# Patient Record
Sex: FEMALE | Race: ASIAN | NOT HISPANIC OR LATINO | Employment: FULL TIME | URBAN - METROPOLITAN AREA
[De-identification: names, ages, dates, MRNs, and addresses within clinical notes are randomized per-mention and may not be internally consistent; named-entity substitution may affect disease eponyms.]

---

## 2017-05-26 ENCOUNTER — TELEPHONE (OUTPATIENT)
Dept: URGENT CARE | Facility: CLINIC | Age: 42
End: 2017-05-26

## 2017-05-26 ENCOUNTER — HOSPITAL ENCOUNTER (OUTPATIENT)
Dept: RADIOLOGY | Facility: MEDICAL CENTER | Age: 42
End: 2017-05-26
Attending: PHYSICIAN ASSISTANT
Payer: COMMERCIAL

## 2017-05-26 ENCOUNTER — OFFICE VISIT (OUTPATIENT)
Dept: URGENT CARE | Facility: CLINIC | Age: 42
End: 2017-05-26
Payer: COMMERCIAL

## 2017-05-26 VITALS
RESPIRATION RATE: 16 BRPM | TEMPERATURE: 98.2 F | HEIGHT: 62 IN | BODY MASS INDEX: 25.03 KG/M2 | WEIGHT: 136 LBS | OXYGEN SATURATION: 100 % | SYSTOLIC BLOOD PRESSURE: 124 MMHG | HEART RATE: 71 BPM | DIASTOLIC BLOOD PRESSURE: 82 MMHG

## 2017-05-26 DIAGNOSIS — L98.9 SKIN LESION OF FOOT: Primary | ICD-10-CM

## 2017-05-26 DIAGNOSIS — S92.514A CLOSED NONDISPLACED FRACTURE OF PROXIMAL PHALANX OF LESSER TOE OF RIGHT FOOT, INITIAL ENCOUNTER: ICD-10-CM

## 2017-05-26 DIAGNOSIS — L98.9 SKIN LESION OF FOOT: ICD-10-CM

## 2017-05-26 DIAGNOSIS — S90.121A: ICD-10-CM

## 2017-05-26 PROCEDURE — 99214 OFFICE O/P EST MOD 30 MIN: CPT | Performed by: PHYSICIAN ASSISTANT

## 2017-05-26 PROCEDURE — 73660 X-RAY EXAM OF TOE(S): CPT | Mod: RT

## 2017-05-26 ASSESSMENT — ENCOUNTER SYMPTOMS
GASTROINTESTINAL NEGATIVE: 1
FALLS: 1
RESPIRATORY NEGATIVE: 1
NEUROLOGICAL NEGATIVE: 1
PSYCHIATRIC NEGATIVE: 1
EYES NEGATIVE: 1
CARDIOVASCULAR NEGATIVE: 1
CONSTITUTIONAL NEGATIVE: 1
ROS SKIN COMMENTS: BRUISING

## 2017-05-26 NOTE — Clinical Note
May 26, 2017         Patient: Venecia Buchanan   YOB: 1975   Date of Visit: 5/26/2017           To Whom it May Concern:    Venecia Buchanan was seen in my clinic on 5/26/2017.     If you have any questions or concerns, please don't hesitate to call.        Sincerely,           Ron Tsai PA-C  Electronically Signed

## 2017-05-26 NOTE — MR AVS SNAPSHOT
"        Venecia Buchanan   2017 4:45 PM   Office Visit   MRN: [id number]    Department:  [facility]   Dept Phone:  [phone]    Description:  Female : 1975   Provider:  Ron Tsai PA-C           Reason for Visit     Foot Problem twisted right ankle into foot/ swollen and painful      Allergies as of 2017     No Known Allergies      You were diagnosed with     Skin lesion of foot   [8043933]  -  Primary     Traumatic ecchymosis of toe, right, initial encounter   [[id number]]         Vital Signs     Blood Pressure Pulse Temperature Respirations Height Weight    124/82 mmHg 71 36.8 °C (98.2 °F) 16 1.575 m (5' 2.01\") 61.689 kg (136 lb)    Body Mass Index Oxygen Saturation Last Menstrual Period Breastfeeding? Smoking Status       24.87 kg/m2 100% 2017 No Never Smoker        Basic Information     Date Of Birth Sex Race Ethnicity Preferred Language    1975 Female  Non- English      Your appointments     2017 10:30 AM   Annual Exam with Lola Alamo M.D.   [facility]    [address]  [facility] [id number] [phone]              Health Maintenance        Date Due Completion Dates    MAMMOGRAM 2016 ---    PAP SMEAR 4/15/2019 4/15/2016, 2014, 10/18/2013, 2012, 9/3/2010    IMM DTaP/Tdap/Td Vaccine (2 - Td) 3/24/2021 3/24/2011            Current Immunizations     Tdap Vaccine 3/24/2011  8:00 PM      Below and/or attached are the medications your provider expects you to take. Review all of your home medications and newly ordered medications with your provider and/or pharmacist. Follow medication instructions as directed by your provider and/or pharmacist. Please keep your medication list with you and share with your provider. Update the information when medications are discontinued, doses are changed, or new medications (including over-the-counter products) are added; and " carry medication information at all times in the event of emergency situations     Allergies:  No Known Allergies          Medications  Valid as of: May 26, 2017 -  5:37 PM    Generic Name Brand Name Tablet Size Instructions for use    Clindamycin HCl (Cap) CLEOCIN 300 MG Take 1 Cap by mouth every 6 hours.        Dicloxacillin Sodium (Cap) DYNAPEN 500 MG Take 1 Cap by mouth 4 times a day. Take 500 mg PO every 6 hours x 7 days        Dicloxacillin Sodium (Cap) DYNAPEN 500 MG Take 1 Cap by mouth 2 times a day. Take 1 PO q 6 hours x 7 days        Docusate Sodium (Cap) COLACE 100 MG Take 100 mg by mouth 2 times a day as needed.        Ibuprofen (Tab) MOTRIN 800 MG Take 800 mg by mouth every 8 hours as needed. For mild pain or discomfort.         Mupirocin Calcium (Ointment) BACTROBAN 2 % Spray  in nose every 12 hours. X 5 days        Oxycodone-Acetaminophen (Tab) PERCOCET 5-325 MG Take 1-2 Tabs by mouth every 6 hours as needed. Indications: Moderate to Moderately Severe Pain        Oxycodone-Acetaminophen (Tab) PERCOCET 5-325 MG Take 1-2 Tabs by mouth every four hours as needed for Mild Pain.        Oxycodone-Acetaminophen (Tab) PERCOCET 5-325 MG Take 1-2 Tabs by mouth every four hours as needed for Mild Pain.        Oxycodone-Acetaminophen (Tab) PERCOCET 5-325 MG Take 1-2 Tabs by mouth every 6 hours as needed.        Prenatal Multivit-Min-Fe-FA   Take  by mouth.        Simethicone (Chew Tab) MYLICON 80 MG Take 80 mg by mouth every 6 hours as needed. Indications: Gas        .                 Medicines prescribed today were sent to:     [facility] - [place], [place] - [address] AT [facility] [address] [facility]    [address] [address]    Phone: [phone] Fax: [phone]    Open 24 Hours?: No      Medication refill instructions:       If your prescription bottle indicates you have medication refills left, it is not necessary to call your provider’s office. Please contact your pharmacy and  they will refill your medication.    If your prescription bottle indicates you do not have any refills left, you may request refills at any time through one of the following ways: The online [employer] system (except Urgent Care), by calling your provider’s office, or by asking your pharmacy to contact your provider’s office with a refill request. Medication refills are processed only during regular business hours and may not be available until the next business day. Your provider may request additional information or to have a follow-up visit with you prior to refilling your medication.   *Please Note: Medication refills are assigned a new Rx number when refilled electronically. Your pharmacy may indicate that no refills were authorized even though a new prescription for the same medication is available at the pharmacy. Please request the medicine by name with the pharmacy before contacting your provider for a refill.        Your To Do List     Future Labs/Procedures Complete By Expires    DX-TOE(S) 2+ RIGHT  As directed 5/26/2018      Referral     A referral request has been sent to our patient care coordination department. Please allow 3-5 business days for us to process this request and contact you either by phone or mail. If you do not hear from us by the 5th business day, please call us at [phone].        Instructions    Toe Fracture  A toe fracture is a break in one of the toe bones (phalanges).  CAUSES  This condition may be caused by:  · Dropping a heavy object on your toe.  · Stubbing your toe.  · Overusing your toe or doing repetitive exercise.  · Twisting or stretching your toe out of place.  RISK FACTORS  This condition is more likely to develop in people who:  · Play contact sports.  · Have a bone disease.  · Have a low calcium level.  SYMPTOMS  The main symptoms of this condition are swelling and pain in the toe. The pain may get worse with standing or walking. Other symptoms  include:  · Bruising.  · Stiffness.  · Numbness.  · A change in the way the toe looks.  · Broken bones that poke through the skin.  · Blood beneath the toenail.  DIAGNOSIS  This condition is diagnosed with a physical exam. You may also have X-rays.  TREATMENT   Treatment for this condition depends on the type of fracture and its severity. Treatment may involve:  · Taping the broken toe to a toe that is next to it (bala taping). This is the most common treatment for fractures in which the bone has not moved out of place (nondisplaced fracture).  · Wearing a shoe that has a wide, rigid sole to protect the toe and to limit its movement.  · Wearing a walking cast.  · Having a procedure to move the toe back into place.  · Surgery. This may be needed:  ¨ If there are many pieces of broken bone that are out of place (displaced).  ¨ If the toe joint breaks.  ¨ If the bone breaks through the skin.  · Physical therapy. This is done to help regain movement and strength in the toe.  You may need follow-up X-rays to make sure that the bone is healing well and staying in position.  HOME CARE INSTRUCTIONS  · If your toe was treated with bala taping, follow instructions from your health care provider about changing the gauze and tape.  If You Have a Cast:  · Do not stick anything inside the cast to scratch your skin. Doing that increases your risk of infection.  · Check the skin around the cast every day. Report any concerns to your health care provider. You may put lotion on dry skin around the edges of the cast. Do not apply lotion to the skin underneath the cast.  · Do not put pressure on any part of the cast until it is fully hardened. This may take several hours.  · Keep the cast clean and dry.  Bathing  · Do not take baths, swim, or use a hot tub until your health care provider approves. Ask your health care provider if you can take showers. You may only be allowed to take sponge baths for bathing.  · If your health care  provider approves bathing and showering, cover the cast or bandage (dressing) with a watertight plastic bag to protect it from water. Do not let the cast or dressing get wet.  Managing Pain, Stiffness, and Swelling  · If you do not have a cast, apply ice to the injured area, if directed.  ¨ Put ice in a plastic bag.  ¨ Place a towel between your skin and the bag.  ¨ Leave the ice on for 20 minutes, 2-3 times per day.  · Move your toes often to avoid stiffness and to lessen swelling.  · Raise (elevate) the injured area above the level of your heart while you are sitting or lying down.  Driving  · Do not drive or operate heavy machinery while taking pain medicine.  · Do not drive while wearing a cast on a foot that you use for driving.  Activity  · Return to your normal activities as directed by your health care provider. Ask your health care provider what activities are safe for you.  · Perform exercises daily as directed by your health care provider or physical therapist.  Safety  · Do not use the injured limb to support your body weight until your health care provider says that you can. Use crutches or other assistive devices as directed by your health care provider.  General Instructions  · If your toe was treated with bala taping, follow your health care provider's instructions for changing the gauze and tape. Change it more often:  ¨ The gauze and tape get wet. If this happens, dry the space between the toes.  ¨ The gauze and tape are too tight and cause your toe to become pale or numb.  · Wear a protective shoe as directed by your health care provider. If you were not given a protective shoe, wear sturdy, supportive shoes. Your shoes should not pinch your toes and should not fit tightly against your toes.  · Do not use any tobacco products, including cigarettes, chewing tobacco, or e-cigarettes. Tobacco can delay bone healing. If you need help quitting, ask your health care provider.  · Take medicines only as  directed by your health care provider.  · Keep all follow-up visits as directed by your health care provider. This is important.  SEEK MEDICAL CARE IF:  · You have a fever.  · Your pain medicine is not helping.  · Your toe is cold.  · Your toe is numb.  · You still have pain after one week of rest and treatment.  · You still have pain after your health care provider has said that you can start walking again.  · You have pain, tingling, or numbness in your foot that is not going away.  SEEK IMMEDIATE MEDICAL CARE IF:  · You have severe pain.  · You have redness or inflammation in your toe that is getting worse.  · You have pain or numbness in your toe that is getting worse.  · Your toe turns blue.     This information is not intended to replace advice given to you by your health care provider. Make sure you discuss any questions you have with your health care provider.     Document Released: 12/15/2001 Document Revised: 05/03/2016 Document Reviewed: 10/14/2015  [employer] Interactive Patient Education ©2016 [employer]            [employer] Access Code: Activation code not generated  Current MedAware Status: Active

## 2017-05-26 NOTE — Clinical Note
May 26, 2017         Patient: Venecia Buchanan   YOB: 1975   Date of Visit: 5/26/2017           To Whom it May Concern:    Venecia Buchanan was seen in my clinic on 5/26/2017. Please excuse her for the next week 5/30-6/2.    If you have any questions or concerns, please don't hesitate to call.        Sincerely,           Ron Tsai PA-C  Electronically Signed

## 2017-05-27 NOTE — TELEPHONE ENCOUNTER
Called patient on cell and informed her of x-ray results.  All questions encouraged and answered. Patient to f/u with pcp.  Recommended RICE.  Return if pain is worsening or having numbness.

## 2017-05-27 NOTE — PROGRESS NOTES
Subjective:      Venecia Buchanan is a 41 y.o. female who presents with Foot Problem            Foot Problem      Chief Complaint   Patient presents with   • Foot Problem     twisted right ankle into foot/ swollen and painful       HPI:  Venecia Buchanan is a 41 y.o. female who presents with right  toe pain after having a ground level mechanical fall this am.  No numbness or tingling.  She states she was getting up from bed and rolled her right ankle.  Has pain now over the little toe with bruising.  Patient denies HA, SOB, chest pain, palpitations, fever, chills, or n/v/d.      No past medical history on file.    Past Surgical History   Procedure Laterality Date   • Primary c section  3/24/2011     Performed by CASSANDRA TAMAYO at [facility]       Family History   Problem Relation Age of Onset   • Diabetes Mother    • Cancer Maternal Grandfather      COLON   • Cancer Paternal Grandmother      BREAST       Social History     Social History   • Marital Status:      Spouse Name: N/A   • Number of Children: N/A   • Years of Education: N/A     Occupational History   • Not on file.     Social History Main Topics   • Smoking status: Never Smoker    • Smokeless tobacco: Not on file   • Alcohol Use: No   • Drug Use: Not on file   • Sexual Activity: No     Other Topics Concern   • Not on file     Social History Narrative         Current outpatient prescriptions:   •  oxycodone-acetaminophen, 1-2 Tab, Oral, Q6HRS PRN, not taking  •  mupirocin, Spray  in nose every 12 hours. X 5 days, Not Taking  •  clindamycin, 300 mg, Oral, Q6HRS, Not Taking  •  oxycodone-acetaminophen, 1-2 Tab, Oral, Q4HRS PRN, Not Taking  •  dicloxacillin, 500 mg, Oral, BID, Not Taking  •  dicloxacillin, 500 mg, Oral, 4X/DAY, Not Taking  •  oxycodone-acetaminophen, 1-2 Tab, Oral, Q4HRS PRN, Not Taking  •  oxycodone-acetaminophen, 1-2 Tab, Oral, Q6HRS PRN, Not Taking  •  docusate sodium, 100 mg, Oral, BID PRN, Not Taking  •  ibuprofen, 800 mg,  "Oral, Q8HRS PRN, Not Taking  •  simethicone, 80 mg, Oral, Q6HRS PRN, Not Taking  •  PRENATAL VITAMINS PO, Take  by mouth., Not Taking at Unknown    No Known Allergies         Review of Systems   Constitutional: Negative.    HENT: Negative.    Eyes: Negative.    Respiratory: Negative.    Cardiovascular: Negative.    Gastrointestinal: Negative.    Genitourinary: Negative.    Musculoskeletal: Positive for joint pain and falls.   Skin:        bruising   Neurological: Negative.    Endo/Heme/Allergies: Negative.    Psychiatric/Behavioral: Negative.           Objective:     /82 mmHg  Pulse 71  Temp(Src) 36.8 °C (98.2 °F)  Resp 16  Ht 1.575 m (5' 2.01\")  Wt 61.689 kg (136 lb)  BMI 24.87 kg/m2  SpO2 100%  LMP 05/20/2017  Breastfeeding? No     Physical Exam       Nursing note and vitals reviewed.    Constitutional:  Appropriately groomed, pleasant affect, well nourished, and in no acute distress.    HEENT:  Head: Atraumatic, normocephalic.    Ears:  Hearing grossly intact to voice.    Eyes:  Conjunctivae clear, sclera white, and medial canthus without exudate bilaterally.    Lungs:  Lungs with normal respiratory excursion and effort.      Right Foot:  No edema, erythema.  Ecchymosis present at the base of the small toe and in the web space between 4th and 5th toe.  No ttp along the 5th metatarsal.  No ttp over the over ATFL, deltoid ligaments, med/lat malleoulus, achilles tendon, or metatarsals.      Diminished ROM for little toe ext/flex.    Sensation equal bilaterally to light touch for L4, L5, and S1.      DTR 2+ for Achilles and patella bilaterally.  NVS intact, DP 2+ bilaterally.  Gait and station wnl, non antalgic.    Derm:  Suspicious 8mm -10mm irregular darkly pigmented macule plantar surface of right foot.  Macule with varying levels of pigmentation.      No other rashes or lesions with good turgor pressure.     Psychiatric:  Normal judgement, mood and affect.       5/26/2017 6:33 PM    HISTORY/REASON " FOR EXAM:  Pain/Deformity Following Trauma.  Right fifth toe pain and swelling following injury    TECHNIQUE/EXAM DESCRIPTION AND NUMBER OF VIEWS:  3 views of the RIGHT toes.    COMPARISON: None    FINDINGS:  There is a fracture in the proximal metaphysis of the fifth proximal phalanx with overlying soft tissue edema. No significant displacement is appreciated.         Impression        Nondisplaced fracture in the proximal metaphysis of the fifth proximal phalanx.         Reading Provider Reading Date     Agueda Almonte M.D. May 26, 2017          Assessment/Plan:     1. Traumatic ecchymosis of toe, right, initial encounter    2. Skin lesion of foot  Patient with highly suspicious lesion to bottom of right foot present for several months.  Has enlarged.  Concern for melanoma and referred to derm and gen surg whomever can biopsy and dx sooner.  Did advise pt f/u with pcp for biopsy if not contacted in the next week.    - REFERRAL TO DERMATOLOGY  - DX-TOE(S) 2+ RIGHT; Future  - REFERRAL TO GENERAL SURGERY    3. Closed nondisplaced fracture of proximal phalanx of lesser toe of right foot, initial encounter  Patient presents with toe injury and ecchymosis after a ground level mechanical fall this am.  On exam patient has reduced flex/ext.  X-ray done at [facility] reviewed by me and with patient demonstrates a proximal phalanx small toe non-displaced fxr.  Did recommend f/u with pcp in 1-2 weeks for repeat x-ray to demonstrate fxr is still non-displaced and developing a callus.  Placed patient in a walking shoe and recommended elevation, icing, and NSAIDs.  Did provide note.  Advised f/u with pcp for FMLA paperwork if requiring more time off work.    Patient was in agreement with this treatment plan and seemed to understand without barriers. All questions were encouraged and answered.  Reviewed signs and symptoms of when to seek emergency medical care.     Please note that this dictation was created using voice  recognition software.  I have made every reasonable attempt to correct obvious errors, but I expect there are errors of katalina and possibly content that I did not discover before finalizing the note.

## 2017-05-27 NOTE — PATIENT INSTRUCTIONS
Toe Fracture  A toe fracture is a break in one of the toe bones (phalanges).  CAUSES  This condition may be caused by:  · Dropping a heavy object on your toe.  · Stubbing your toe.  · Overusing your toe or doing repetitive exercise.  · Twisting or stretching your toe out of place.  RISK FACTORS  This condition is more likely to develop in people who:  · Play contact sports.  · Have a bone disease.  · Have a low calcium level.  SYMPTOMS  The main symptoms of this condition are swelling and pain in the toe. The pain may get worse with standing or walking. Other symptoms include:  · Bruising.  · Stiffness.  · Numbness.  · A change in the way the toe looks.  · Broken bones that poke through the skin.  · Blood beneath the toenail.  DIAGNOSIS  This condition is diagnosed with a physical exam. You may also have X-rays.  TREATMENT   Treatment for this condition depends on the type of fracture and its severity. Treatment may involve:  · Taping the broken toe to a toe that is next to it (bala taping). This is the most common treatment for fractures in which the bone has not moved out of place (nondisplaced fracture).  · Wearing a shoe that has a wide, rigid sole to protect the toe and to limit its movement.  · Wearing a walking cast.  · Having a procedure to move the toe back into place.  · Surgery. This may be needed:  ¨ If there are many pieces of broken bone that are out of place (displaced).  ¨ If the toe joint breaks.  ¨ If the bone breaks through the skin.  · Physical therapy. This is done to help regain movement and strength in the toe.  You may need follow-up X-rays to make sure that the bone is healing well and staying in position.  HOME CARE INSTRUCTIONS  · If your toe was treated with bala taping, follow instructions from your health care provider about changing the gauze and tape.  If You Have a Cast:  · Do not stick anything inside the cast to scratch your skin. Doing that increases your risk of  infection.  · Check the skin around the cast every day. Report any concerns to your health care provider. You may put lotion on dry skin around the edges of the cast. Do not apply lotion to the skin underneath the cast.  · Do not put pressure on any part of the cast until it is fully hardened. This may take several hours.  · Keep the cast clean and dry.  Bathing  · Do not take baths, swim, or use a hot tub until your health care provider approves. Ask your health care provider if you can take showers. You may only be allowed to take sponge baths for bathing.  · If your health care provider approves bathing and showering, cover the cast or bandage (dressing) with a watertight plastic bag to protect it from water. Do not let the cast or dressing get wet.  Managing Pain, Stiffness, and Swelling  · If you do not have a cast, apply ice to the injured area, if directed.  ¨ Put ice in a plastic bag.  ¨ Place a towel between your skin and the bag.  ¨ Leave the ice on for 20 minutes, 2-3 times per day.  · Move your toes often to avoid stiffness and to lessen swelling.  · Raise (elevate) the injured area above the level of your heart while you are sitting or lying down.  Driving  · Do not drive or operate heavy machinery while taking pain medicine.  · Do not drive while wearing a cast on a foot that you use for driving.  Activity  · Return to your normal activities as directed by your health care provider. Ask your health care provider what activities are safe for you.  · Perform exercises daily as directed by your health care provider or physical therapist.  Safety  · Do not use the injured limb to support your body weight until your health care provider says that you can. Use crutches or other assistive devices as directed by your health care provider.  General Instructions  · If your toe was treated with bala taping, follow your health care provider's instructions for changing the gauze and tape. Change it more  often:  ¨ The gauze and tape get wet. If this happens, dry the space between the toes.  ¨ The gauze and tape are too tight and cause your toe to become pale or numb.  · Wear a protective shoe as directed by your health care provider. If you were not given a protective shoe, wear sturdy, supportive shoes. Your shoes should not pinch your toes and should not fit tightly against your toes.  · Do not use any tobacco products, including cigarettes, chewing tobacco, or e-cigarettes. Tobacco can delay bone healing. If you need help quitting, ask your health care provider.  · Take medicines only as directed by your health care provider.  · Keep all follow-up visits as directed by your health care provider. This is important.  SEEK MEDICAL CARE IF:  · You have a fever.  · Your pain medicine is not helping.  · Your toe is cold.  · Your toe is numb.  · You still have pain after one week of rest and treatment.  · You still have pain after your health care provider has said that you can start walking again.  · You have pain, tingling, or numbness in your foot that is not going away.  SEEK IMMEDIATE MEDICAL CARE IF:  · You have severe pain.  · You have redness or inflammation in your toe that is getting worse.  · You have pain or numbness in your toe that is getting worse.  · Your toe turns blue.     This information is not intended to replace advice given to you by your health care provider. Make sure you discuss any questions you have with your health care provider.     Document Released: 12/15/2001 Document Revised: 05/03/2016 Document Reviewed: 10/14/2015  [employer] Interactive Patient Education ©2016 [facility] [employer]

## 2017-07-05 ENCOUNTER — HOSPITAL ENCOUNTER (OUTPATIENT)
Facility: MEDICAL CENTER | Age: 42
End: 2017-07-05
Attending: OBSTETRICS & GYNECOLOGY
Payer: COMMERCIAL

## 2017-07-05 ENCOUNTER — GYNECOLOGY VISIT (OUTPATIENT)
Dept: OBGYN | Facility: MEDICAL CENTER | Age: 42
End: 2017-07-05
Payer: COMMERCIAL

## 2017-07-05 VITALS
WEIGHT: 126.4 LBS | HEIGHT: 62 IN | DIASTOLIC BLOOD PRESSURE: 72 MMHG | BODY MASS INDEX: 23.26 KG/M2 | SYSTOLIC BLOOD PRESSURE: 120 MMHG

## 2017-07-05 DIAGNOSIS — Z12.4 SCREENING FOR MALIGNANT NEOPLASM OF CERVIX: ICD-10-CM

## 2017-07-05 DIAGNOSIS — Z11.51 SCREENING FOR HUMAN PAPILLOMAVIRUS: ICD-10-CM

## 2017-07-05 DIAGNOSIS — Z01.419 WELL FEMALE EXAM WITH ROUTINE GYNECOLOGICAL EXAM: ICD-10-CM

## 2017-07-05 DIAGNOSIS — D48.5 NEOPLASM OF UNCERTAIN BEHAVIOR OF SKIN: ICD-10-CM

## 2017-07-05 PROCEDURE — 99396 PREV VISIT EST AGE 40-64: CPT | Performed by: OBSTETRICS & GYNECOLOGY

## 2017-07-05 PROCEDURE — 87624 HPV HI-RISK TYP POOLED RSLT: CPT

## 2017-07-05 PROCEDURE — 88175 CYTOPATH C/V AUTO FLUID REDO: CPT

## 2017-07-05 NOTE — PROGRESS NOTES
ANNUAL Gynecologic Exam     HPI Comments:   41 year old  presents for well woman exam.   Patient's last menstrual period was 2017.  Regular periods, no aberration  No pelvic pains  No method of BC at this time  Never smoker  Denies family history of breast/ovarian cancer    Review of Systems   Pertinent positives documented in HPI and all other systems reviewed & are negative    All PMH, PSH, allergies, social history and FH reviewed and updated today:  History reviewed. No pertinent past medical history.  Past Surgical History   Procedure Laterality Date   • Primary c section  3/24/2011     Performed by CASSANDRA TAMAYO at [facility]     Review of patient's allergies indicates no known allergies.  Social History     Social History   • Marital Status:      Spouse Name: N/A   • Number of Children: N/A   • Years of Education: N/A     Social History Main Topics   • Smoking status: Never Smoker    • Smokeless tobacco: None   • Alcohol Use: No   • Drug Use: None   • Sexual Activity: No     Other Topics Concern   • None     Social History Narrative     Family History   Problem Relation Age of Onset   • Diabetes Mother    • Cancer Maternal Grandfather      COLON   • Cancer Paternal Grandmother      BREAST     Medications:   Current Outpatient Prescriptions Ordered in [facility]   Medication Sig Dispense Refill   • oxycodone-acetaminophen (PERCOCET) 5-325 MG TABS Take 1-2 Tabs by mouth every 6 hours as needed. 10 Tab 0   • mupirocin calcium (BACTROBAN NASAL) 2 % nasal ointment Spray  in nose every 12 hours. X 5 days 1 Tube 0   • clindamycin (CLEOCIN) 300 MG CAPS Take 1 Cap by mouth every 6 hours. 28 Cap 0   • oxycodone-acetaminophen (PERCOCET) 5-325 MG TABS Take 1-2 Tabs by mouth every four hours as needed for Mild Pain. 30 Each 0   • dicloxacillin (DYNAPEN) 500 MG CAPS Take 1 Cap by mouth 2 times a day. Take 1 PO q 6 hours x 7 days 28 Cap 0   • dicloxacillin (DYNAPEN) 500 MG CAPS Take 1 Cap by mouth 4 times  "a day. Take 500 mg PO every 6 hours x 7 days 28 Cap 0   • oxycodone-acetaminophen (PERCOCET) 5-325 MG TABS Take 1-2 Tabs by mouth every four hours as needed for Mild Pain. 30 Each 0   • oxycodone-acetaminophen (PERCOCET) 5-325 MG TABS Take 1-2 Tabs by mouth every 6 hours as needed. Indications: Moderate to Moderately Severe Pain     • docusate sodium (COLACE) 100 MG CAPS Take 100 mg by mouth 2 times a day as needed.     • ibuprofen (MOTRIN) 800 MG TABS Take 800 mg by mouth every 8 hours as needed. For mild pain or discomfort.      • simethicone (MYLICON) 80 MG CHEW Take 80 mg by mouth every 6 hours as needed. Indications: Gas     • PRENATAL VITAMINS PO Take  by mouth.       No current Epic-ordered facility-administered medications on file.      Objective:   Vital measurements:  Blood pressure 120/72, height 1.575 m (5' 2\"), weight 57.335 kg (126 lb 6.4 oz), last menstrual period 06/16/2017, not currently breastfeeding.  Body mass index is 23.11 kg/(m^2). (Goal BM I>18 <25)    Physical Exam   Nursing note and vitals reviewed.  Constitutional: She is oriented to person, place, and time. She appears well-developed and well-nourished. No distress.     HEENT:   Head: Normocephalic and atraumatic.   Right Ear: External ear normal.   Left Ear: External ear normal.   Nose: Nose normal.   Eyes: Conjunctivae and EOM are normal. Pupils are equal, round, and reactive to light. No scleral icterus.     Neck: Normal range of motion. Neck supple. No tracheal deviation present. No thyromegaly present.     Pulmonary/Chest: Effort normal and breath sounds normal. No respiratory distress. She has no wheezes. She has no rales. She exhibits no tenderness.     Cardiovascular: Regular, rate and rhythm. No JVD.    Abdominal: Soft. Bowel sounds are normal. She exhibits no distension and no mass. No tenderness. She has no rebound and no guarding.     Left axilla - 1-2 mm movable nodule, seems subcutaneous, no " tenderness    Breast:  Symmetrical, normal consistency without masses., No dimpling or skin changes, Normal nipples without discharge, negative    Genitourinary:  Pelvic exam was performed with patient supine.  External genitalia with no abnormal pigmentation, labial fusion,rash, tenderness, lesion or injury to the labia bilaterally.  Vagina is moist with no lesions, foul discharge, erythema, tenderness or bleeding. No foreign body around the vagina or signs of injury.   Cervix exhibits no motion tenderness, no discharge and no friability.   Uterus is  not deviated, not enlarged, not fixed and not tender.  Right adnexum displays no mass, no tenderness and no fullness. Left adnexum displays no mass, no tenderness and no fullness.     Musculoskeletal: Normal range of motion. She exhibits no edema and no tenderness.     Lymphadenopathy: She has no cervical adenopathy.     Neurological: She is alert and oriented to person, place, and time. She exhibits normal muscle tone.     Skin: Skin is warm and dry. No rash noted. She is not diaphoretic. No erythema. No pallor. right heel  - (+) hyperpigmented lesion 5 mm - as per pt, is increasing in size      Psychiatric: She has a normal mood and affect. Her behavior is normal. Judgment and thought content normal  Assessment:     1. Well female exam with routine gynecological exam  THINPREP PAP WITH HPV   2. Screening for malignant neoplasm of cervix  THINPREP PAP WITH HPV   3. Screening for human papillomavirus  THINPREP PAP WITH HPV   4. Neoplasm of uncertain behavior of skin  REFERRAL TO DERMATOLOGY     Plan:   Pap and physical exam performed. Recommendation for pap discussed with her q 3 years, pt prefers yearly pap  Monthly SBE.  Counseling: breast self exam, mammography screening, STD prevention, HIV risk factors and prevention and family planning choices  Encourage exercise and proper diet.  Mammograms annually. Obtain most recent report from C  See medications and  orders placed in encounter report.

## 2017-07-05 NOTE — MR AVS SNAPSHOT
"Venecia Buchanan   2017 10:30 AM   Gynecology Visit   MRN: [id number]    Department:  [facility]   Dept Phone:  [phone]    Description:  Female : 1975   Provider:  Lola Alamo M.D.           Allergies as of 2017     No Known Allergies      You were diagnosed with     Well female exam with routine gynecological exam   [181865]       Screening for malignant neoplasm of cervix   [245434]       Screening for human papillomavirus   [845268]       Neoplasm of uncertain behavior of skin   [238.2.ICD-9-CM]         Vital Signs     Blood Pressure Height Weight Body Mass Index Last Menstrual Period Breastfeeding?    120/72 mmHg 1.575 m (5' 2\") 57.335 kg (126 lb 6.4 oz) 23.11 kg/m2 2017 No    Smoking Status                   Never Smoker            Basic Information     Date Of Birth Sex Race Ethnicity Preferred Language    1975 Female  Non- English      Health Maintenance        Date Due Completion Dates    MAMMOGRAM 2016 ---    IMM INFLUENZA (1) 2017 ---    PAP SMEAR 4/15/2019 4/15/2016, 2014, 10/18/2013, 2012, 9/3/2010    IMM DTaP/Tdap/Td Vaccine (2 - Td) 3/24/2021 3/24/2011            Current Immunizations     Tdap Vaccine 3/24/2011  8:00 PM      Below and/or attached are the medications your provider expects you to take. Review all of your home medications and newly ordered medications with your provider and/or pharmacist. Follow medication instructions as directed by your provider and/or pharmacist. Please keep your medication list with you and share with your provider. Update the information when medications are discontinued, doses are changed, or new medications (including over-the-counter products) are added; and carry medication information at all times in the event of emergency situations     Allergies:  No Known Allergies          Medications  Valid as of: 2017 - 11:09 AM    Generic Name Brand Name Tablet Size " Instructions for use    Clindamycin HCl (Cap) CLEOCIN 300 MG Take 1 Cap by mouth every 6 hours.        Dicloxacillin Sodium (Cap) DYNAPEN 500 MG Take 1 Cap by mouth 4 times a day. Take 500 mg PO every 6 hours x 7 days        Dicloxacillin Sodium (Cap) DYNAPEN 500 MG Take 1 Cap by mouth 2 times a day. Take 1 PO q 6 hours x 7 days        Docusate Sodium (Cap) COLACE 100 MG Take 100 mg by mouth 2 times a day as needed.        Ibuprofen (Tab) MOTRIN 800 MG Take 800 mg by mouth every 8 hours as needed. For mild pain or discomfort.         Mupirocin Calcium (Ointment) BACTROBAN 2 % Spray  in nose every 12 hours. X 5 days        Oxycodone-Acetaminophen (Tab) PERCOCET 5-325 MG Take 1-2 Tabs by mouth every 6 hours as needed. Indications: Moderate to Moderately Severe Pain        Oxycodone-Acetaminophen (Tab) PERCOCET 5-325 MG Take 1-2 Tabs by mouth every four hours as needed for Mild Pain.        Oxycodone-Acetaminophen (Tab) PERCOCET 5-325 MG Take 1-2 Tabs by mouth every four hours as needed for Mild Pain.        Oxycodone-Acetaminophen (Tab) PERCOCET 5-325 MG Take 1-2 Tabs by mouth every 6 hours as needed.        Prenatal Multivit-Min-Fe-FA   Take  by mouth.        Simethicone (Chew Tab) MYLICON 80 MG Take 80 mg by mouth every 6 hours as needed. Indications: Gas        .                 Medicines prescribed today were sent to:     [facility] [address] [place] [address] & [address] [address]    Phone: [phone] Fax: [phone]    Open 24 Hours?: No      Medication refill instructions:       If your prescription bottle indicates you have medication refills left, it is not necessary to call your provider’s office. Please contact your pharmacy and they will refill your medication.    If your prescription bottle indicates you do not have any refills left, you may request refills at any time through one of the following ways: The online imagine system (except  Urgent Care), by calling your provider’s office, or by asking your pharmacy to contact your provider’s office with a refill request. Medication refills are processed only during regular business hours and may not be available until the next business day. Your provider may request additional information or to have a follow-up visit with you prior to refilling your medication.   *Please Note: Medication refills are assigned a new Rx number when refilled electronically. Your pharmacy may indicate that no refills were authorized even though a new prescription for the same medication is available at the pharmacy. Please request the medicine by name with the pharmacy before contacting your provider for a refill.        Your To Do List     Future Labs/Procedures Complete By Expires    THINPREP PAP WITH HPV  As directed 7/5/2018      Referral     A referral request has been sent to our patient care coordination department. Please allow 3-5 business days for us to process this request and contact you either by phone or mail. If you do not hear from us by the 5th business day, please call us at [phone].           [employer] Access Code: Activation code not generated  Current [employer] Status: Active

## 2017-07-06 LAB
CYTOLOGY REG CYTOL: NORMAL
HPV HR 12 DNA CVX QL NAA+PROBE: NEGATIVE
HPV16 DNA SPEC QL NAA+PROBE: NEGATIVE
HPV18 DNA SPEC QL NAA+PROBE: NEGATIVE
SPECIMEN SOURCE: NORMAL

## 2017-09-18 PROBLEM — D22.71 MELANOCYTIC NEVI OF RIGHT LOWER LIMB, INCLUDING HIP: Status: ACTIVE | Noted: 2017-09-18

## 2017-10-02 PROBLEM — D49.2 NEOPLASM OF UNSPECIFIED BEHAVIOR OF BONE, SOFT TISSUE, AND SKIN: Status: RESOLVED | Noted: 2017-09-18 | Resolved: 2017-10-02

## 2017-12-18 ENCOUNTER — APPOINTMENT (RX ONLY)
Dept: URBAN - METROPOLITAN AREA CLINIC 4 | Facility: CLINIC | Age: 42
Setting detail: DERMATOLOGY
End: 2017-12-18

## 2017-12-18 DIAGNOSIS — L81.4 OTHER MELANIN HYPERPIGMENTATION: ICD-10-CM

## 2017-12-18 DIAGNOSIS — L72.0 EPIDERMAL CYST: ICD-10-CM

## 2017-12-18 DIAGNOSIS — I78.8 OTHER DISEASES OF CAPILLARIES: ICD-10-CM

## 2017-12-18 DIAGNOSIS — D22 MELANOCYTIC NEVI: ICD-10-CM

## 2017-12-18 PROBLEM — D22.71 MELANOCYTIC NEVI OF RIGHT LOWER LIMB, INCLUDING HIP: Status: ACTIVE | Noted: 2017-12-18

## 2017-12-18 PROCEDURE — ? COUNSELING

## 2017-12-18 PROCEDURE — 99213 OFFICE O/P EST LOW 20 MIN: CPT

## 2017-12-18 ASSESSMENT — LOCATION SIMPLE DESCRIPTION DERM
LOCATION SIMPLE: LEFT AXILLARY VAULT
LOCATION SIMPLE: RIGHT CHEEK
LOCATION SIMPLE: LEFT CHEEK
LOCATION SIMPLE: INFERIOR FOREHEAD
LOCATION SIMPLE: RIGHT PLANTAR SURFACE
LOCATION SIMPLE: CHIN

## 2017-12-18 ASSESSMENT — LOCATION DETAILED DESCRIPTION DERM
LOCATION DETAILED: LEFT INFERIOR CENTRAL MALAR CHEEK
LOCATION DETAILED: LEFT AXILLARY VAULT
LOCATION DETAILED: RIGHT CHIN
LOCATION DETAILED: RIGHT INFERIOR CENTRAL MALAR CHEEK
LOCATION DETAILED: INFERIOR MID FOREHEAD
LOCATION DETAILED: LEFT INFERIOR LATERAL MALAR CHEEK
LOCATION DETAILED: RIGHT INSTEP

## 2017-12-18 ASSESSMENT — LOCATION ZONE DERM
LOCATION ZONE: FACE
LOCATION ZONE: AXILLAE
LOCATION ZONE: FEET

## 2017-12-18 NOTE — PROCEDURE: COUNSELING
Detail Level: Zone
Detail Level: Detailed
Patient Specific Counseling (Will Not Stick From Patient To Patient): Patient will call if cyst becomes bothersome for removal (excision).

## 2017-12-18 NOTE — HPI: FOLLOW UP OTHER
What Is Your Reason For Requesting A Follow-Up Appointment?: Patient seen last visit had biopsy done on right plantar which was sent to [facility] for evaluation, diagnosis revealed a Junctional Melanocytic Nevus without any worrisome features or anything that compels further treatment. \\nPatient has no other complaints or concerns

## 2017-12-18 NOTE — HPI: SECONDARY COMPLAINT
How Severe Is This Condition?: mild
Additional History: Patient interested in cosmetic procedures for aging spots/texture

## 2018-01-03 ENCOUNTER — APPOINTMENT (OUTPATIENT)
Dept: RADIOLOGY | Facility: IMAGING CENTER | Age: 43
End: 2018-01-03
Attending: EMERGENCY MEDICINE
Payer: COMMERCIAL

## 2018-01-03 ENCOUNTER — HOSPITAL ENCOUNTER (OUTPATIENT)
Dept: LAB | Facility: MEDICAL CENTER | Age: 43
End: 2018-01-03
Attending: EMERGENCY MEDICINE
Payer: COMMERCIAL

## 2018-01-03 ENCOUNTER — OFFICE VISIT (OUTPATIENT)
Dept: URGENT CARE | Facility: CLINIC | Age: 43
End: 2018-01-03
Payer: COMMERCIAL

## 2018-01-03 VITALS
SYSTOLIC BLOOD PRESSURE: 110 MMHG | TEMPERATURE: 97.6 F | HEIGHT: 62 IN | BODY MASS INDEX: 23.19 KG/M2 | HEART RATE: 76 BPM | WEIGHT: 126 LBS | DIASTOLIC BLOOD PRESSURE: 68 MMHG | RESPIRATION RATE: 14 BRPM | OXYGEN SATURATION: 98 %

## 2018-01-03 DIAGNOSIS — R04.2 COUGH WITH HEMOPTYSIS: ICD-10-CM

## 2018-01-03 DIAGNOSIS — Z32.02 NEGATIVE PREGNANCY TEST: ICD-10-CM

## 2018-01-03 DIAGNOSIS — J98.11 ATELECTASIS OF RIGHT LUNG: ICD-10-CM

## 2018-01-03 LAB
ALBUMIN SERPL BCP-MCNC: 4.4 G/DL (ref 3.2–4.9)
ALBUMIN/GLOB SERPL: 1.3 G/DL
ALP SERPL-CCNC: 48 U/L (ref 30–99)
ALT SERPL-CCNC: 11 U/L (ref 2–50)
ANION GAP SERPL CALC-SCNC: 9 MMOL/L (ref 0–11.9)
APTT PPP: 32.4 SEC (ref 24.7–36)
AST SERPL-CCNC: 14 U/L (ref 12–45)
BASOPHILS # BLD AUTO: 0.4 % (ref 0–1.8)
BASOPHILS # BLD: 0.03 K/UL (ref 0–0.12)
BILIRUB SERPL-MCNC: 0.5 MG/DL (ref 0.1–1.5)
BUN SERPL-MCNC: 11 MG/DL (ref 8–22)
CALCIUM SERPL-MCNC: 9.2 MG/DL (ref 8.5–10.5)
CHLORIDE SERPL-SCNC: 104 MMOL/L (ref 96–112)
CO2 SERPL-SCNC: 22 MMOL/L (ref 20–33)
CREAT SERPL-MCNC: 0.5 MG/DL (ref 0.5–1.4)
EOSINOPHIL # BLD AUTO: 0.09 K/UL (ref 0–0.51)
EOSINOPHIL NFR BLD: 1.2 % (ref 0–6.9)
ERYTHROCYTE [DISTWIDTH] IN BLOOD BY AUTOMATED COUNT: 42 FL (ref 35.9–50)
GFR SERPL CREATININE-BSD FRML MDRD: >60 ML/MIN/1.73 M 2
GLOBULIN SER CALC-MCNC: 3.3 G/DL (ref 1.9–3.5)
GLUCOSE SERPL-MCNC: 97 MG/DL (ref 65–99)
HCT VFR BLD AUTO: 39.8 % (ref 37–47)
HGB BLD-MCNC: 12.7 G/DL (ref 12–16)
IMM GRANULOCYTES # BLD AUTO: 0.01 K/UL (ref 0–0.11)
IMM GRANULOCYTES NFR BLD AUTO: 0.1 % (ref 0–0.9)
INR PPP: 0.98 (ref 0.87–1.13)
INT CON NEG: NEGATIVE
INT CON POS: POSITIVE
LYMPHOCYTES # BLD AUTO: 1.45 K/UL (ref 1–4.8)
LYMPHOCYTES NFR BLD: 18.9 % (ref 22–41)
MCH RBC QN AUTO: 28.3 PG (ref 27–33)
MCHC RBC AUTO-ENTMCNC: 31.9 G/DL (ref 33.6–35)
MCV RBC AUTO: 88.6 FL (ref 81.4–97.8)
MONOCYTES # BLD AUTO: 0.5 K/UL (ref 0–0.85)
MONOCYTES NFR BLD AUTO: 6.5 % (ref 0–13.4)
NEUTROPHILS # BLD AUTO: 5.59 K/UL (ref 2–7.15)
NEUTROPHILS NFR BLD: 72.9 % (ref 44–72)
NRBC # BLD AUTO: 0 K/UL
NRBC BLD-RTO: 0 /100 WBC
PLATELET # BLD AUTO: 283 K/UL (ref 164–446)
PMV BLD AUTO: 10 FL (ref 9–12.9)
POC URINE PREGNANCY TEST: NORMAL
POTASSIUM SERPL-SCNC: 3.6 MMOL/L (ref 3.6–5.5)
PROT SERPL-MCNC: 7.7 G/DL (ref 6–8.2)
PROTHROMBIN TIME: 12.7 SEC (ref 12–14.6)
RBC # BLD AUTO: 4.49 M/UL (ref 4.2–5.4)
SODIUM SERPL-SCNC: 135 MMOL/L (ref 135–145)
WBC # BLD AUTO: 7.7 K/UL (ref 4.8–10.8)

## 2018-01-03 PROCEDURE — 99204 OFFICE O/P NEW MOD 45 MIN: CPT | Performed by: EMERGENCY MEDICINE

## 2018-01-03 PROCEDURE — 85730 THROMBOPLASTIN TIME PARTIAL: CPT

## 2018-01-03 PROCEDURE — 86580 TB INTRADERMAL TEST: CPT | Performed by: EMERGENCY MEDICINE

## 2018-01-03 PROCEDURE — 80053 COMPREHEN METABOLIC PANEL: CPT

## 2018-01-03 PROCEDURE — 85610 PROTHROMBIN TIME: CPT

## 2018-01-03 PROCEDURE — 85025 COMPLETE CBC W/AUTO DIFF WBC: CPT

## 2018-01-03 PROCEDURE — 36415 COLL VENOUS BLD VENIPUNCTURE: CPT

## 2018-01-03 PROCEDURE — 81025 URINE PREGNANCY TEST: CPT | Performed by: EMERGENCY MEDICINE

## 2018-01-03 PROCEDURE — 71046 X-RAY EXAM CHEST 2 VIEWS: CPT | Mod: 26 | Performed by: EMERGENCY MEDICINE

## 2018-01-03 RX ORDER — AMOXICILLIN 500 MG/1
1000 CAPSULE ORAL 3 TIMES DAILY
Qty: 42 CAP | Refills: 0 | Status: SHIPPED | OUTPATIENT
Start: 2018-01-03 | End: 2018-01-10

## 2018-01-03 RX ORDER — AZITHROMYCIN 250 MG/1
TABLET, FILM COATED ORAL
Qty: 6 TAB | Refills: 0 | Status: SHIPPED | OUTPATIENT
Start: 2018-01-03 | End: 2018-03-19

## 2018-01-03 ASSESSMENT — ENCOUNTER SYMPTOMS
MYALGIAS: 0
COUGH: 1
SWEATS: 0
FEVER: 0
WHEEZING: 0
WEIGHT LOSS: 0
SPUTUM PRODUCTION: 1
CHILLS: 0
SORE THROAT: 0
HEARTBURN: 0
BLOOD IN STOOL: 0
RHINORRHEA: 0
SHORTNESS OF BREATH: 0
HEADACHES: 0
HEMOPTYSIS: 1

## 2018-01-03 NOTE — PROGRESS NOTES
Subjective:      Venecia Buchanan is a 42 y.o. female who presents with Other (pt coughed up bloody sputum this a.m. pt does not feel ill .)            Cough   This is a new problem. The current episode started in the past 7 days. The problem has been gradually improving. The cough is productive of sputum. Associated symptoms include hemoptysis. Pertinent negatives include no chest pain, chills, fever, headaches, heartburn, myalgias, nasal congestion, rash, rhinorrhea, sore throat, shortness of breath, sweats, weight loss or wheezing. Nothing aggravates the symptoms. She has tried nothing for the symptoms. There is no history of asthma, environmental allergies or pneumonia.   Notes mild cough productive of minimal nonpurulent sputum for a few days associated with coryzal symptoms, felt improved. Today had a few episodes of bloody sputum. No significant travel history or TB risk noted. No history of coagulopathy or risk factors for DVT.    Review of Systems   Constitutional: Negative for chills, fever, malaise/fatigue and weight loss.   HENT: Negative for congestion, nosebleeds, rhinorrhea and sore throat.    Respiratory: Positive for cough, hemoptysis and sputum production. Negative for shortness of breath and wheezing.    Cardiovascular: Negative for chest pain and leg swelling.   Gastrointestinal: Negative for blood in stool and heartburn.   Genitourinary: Negative for hematuria.   Musculoskeletal: Negative for joint pain and myalgias.   Skin: Negative for rash.   Neurological: Negative for headaches.   Endo/Heme/Allergies: Negative for environmental allergies.     PMH:  has no past medical history on file.  MEDS:   Current Outpatient Prescriptions:   •  oxycodone-acetaminophen (PERCOCET) 5-325 MG TABS, Take 1-2 Tabs by mouth every 6 hours as needed., Disp: 10 Tab, Rfl: 0  •  mupirocin calcium (BACTROBAN NASAL) 2 % nasal ointment, Spray  in nose every 12 hours. X 5 days, Disp: 1 Tube, Rfl: 0  •  clindamycin  "(CLEOCIN) 300 MG CAPS, Take 1 Cap by mouth every 6 hours., Disp: 28 Cap, Rfl: 0  •  oxycodone-acetaminophen (PERCOCET) 5-325 MG TABS, Take 1-2 Tabs by mouth every four hours as needed for Mild Pain., Disp: 30 Each, Rfl: 0  •  dicloxacillin (DYNAPEN) 500 MG CAPS, Take 1 Cap by mouth 2 times a day. Take 1 PO q 6 hours x 7 days, Disp: 28 Cap, Rfl: 0  •  dicloxacillin (DYNAPEN) 500 MG CAPS, Take 1 Cap by mouth 4 times a day. Take 500 mg PO every 6 hours x 7 days, Disp: 28 Cap, Rfl: 0  •  oxycodone-acetaminophen (PERCOCET) 5-325 MG TABS, Take 1-2 Tabs by mouth every four hours as needed for Mild Pain., Disp: 30 Each, Rfl: 0  •  oxycodone-acetaminophen (PERCOCET) 5-325 MG TABS, Take 1-2 Tabs by mouth every 6 hours as needed. Indications: Moderate to Moderately Severe Pain, Disp: , Rfl:   •  docusate sodium (COLACE) 100 MG CAPS, Take 100 mg by mouth 2 times a day as needed., Disp: , Rfl:   •  ibuprofen (MOTRIN) 800 MG TABS, Take 800 mg by mouth every 8 hours as needed. For mild pain or discomfort. , Disp: , Rfl:   •  simethicone (MYLICON) 80 MG CHEW, Take 80 mg by mouth every 6 hours as needed. Indications: Gas, Disp: , Rfl:   •  PRENATAL VITAMINS PO, Take  by mouth., Disp: , Rfl:   ALLERGIES: No Known Allergies  SURGHX:   Past Surgical History:   Procedure Laterality Date   • PRIMARY C SECTION  3/24/2011    Performed by CASSANDRA TAMAYO at [facility]     SOCHX:  reports that she has never smoked. She has never used smokeless tobacco. She reports that she does not drink alcohol or use drugs.  FH: family history includes Cancer in her maternal grandfather and paternal grandmother; Diabetes in her mother.       Objective:     /68   Pulse 76   Temp 36.4 °C (97.6 °F)   Resp 14   Ht 1.575 m (5' 2\")   Wt 57.2 kg (126 lb)   LMP 12/10/2017   SpO2 98%   Breastfeeding? No   BMI 23.05 kg/m²      Physical Exam   Constitutional: She is oriented to person, place, and time. She appears well-developed and well-nourished. " She is cooperative.  Non-toxic appearance. She does not have a sickly appearance. She does not appear ill. No distress.   HENT:   Head: Normocephalic.   Right Ear: Tympanic membrane and ear canal normal.   Left Ear: Tympanic membrane and ear canal normal.   Nose: No mucosal edema or rhinorrhea. No epistaxis.   Mouth/Throat: Oropharynx is clear and moist and mucous membranes are normal. No oral lesions. No uvula swelling.   Eyes: Conjunctivae, EOM and lids are normal. No scleral icterus.   Neck: Trachea normal and phonation normal. Neck supple. No JVD present. No thyromegaly present.   Cardiovascular: Normal rate, regular rhythm and normal heart sounds.    No murmur heard.  No significant pedal edema. No calf tenderness, Homans sign negative.   Pulmonary/Chest: Effort normal. She has no decreased breath sounds. She has no wheezes. She has no rhonchi. She has no rales.   Abdominal: Soft. She exhibits no distension. There is no hepatosplenomegaly. There is no tenderness. There is no CVA tenderness.   Musculoskeletal:   No joint effusions   Lymphadenopathy:     She has no cervical adenopathy.        Right: No supraclavicular adenopathy present.        Left: No supraclavicular adenopathy present.   Neurological: She is alert and oriented to person, place, and time. She exhibits normal muscle tone. Gait normal.   Skin: Skin is warm and dry. No rash noted.   Psychiatric: She has a normal mood and affect.          Advised patient by telephone of lab test results; continue plan with medications and follow-up for PPD read.     Assessment/Plan:     1. Atelectasis of right lung  Rx amoxicillin, azithromycin  Avoid NSAID use  Advised need for PCP follow-up as soon as available  2. Cough with hemoptysis  - DX-CHEST-2 VIEWS; per radiologist:  Right middle lobe atelectasis. Pneumonitis/pneumonia not excluded. Interval follow-up recommended.  PPD  CBC: minimal left shift  CMP: normal  PT/PTT: normal  Advised need for emergency  department evaluation if any breathing issues or continued/worsening bleeding.  3. Negative pregnancy test  negative- POCT Pregnancy

## 2018-01-05 ENCOUNTER — NON-PROVIDER VISIT (OUTPATIENT)
Dept: OCCUPATIONAL MEDICINE | Facility: CLINIC | Age: 43
End: 2018-01-05

## 2018-01-05 LAB — TB WHEAL 3D P 5 TU DIAM: NORMAL MM

## 2018-01-23 ENCOUNTER — HOSPITAL ENCOUNTER (OUTPATIENT)
Dept: RADIOLOGY | Facility: MEDICAL CENTER | Age: 43
End: 2018-01-23
Attending: FAMILY MEDICINE
Payer: COMMERCIAL

## 2018-01-23 DIAGNOSIS — R05.9 COUGH: ICD-10-CM

## 2018-01-23 PROCEDURE — 71046 X-RAY EXAM CHEST 2 VIEWS: CPT

## 2018-03-06 ENCOUNTER — APPOINTMENT (OUTPATIENT)
Dept: MEDICAL GROUP | Age: 43
End: 2018-03-06
Payer: COMMERCIAL

## 2018-03-19 ENCOUNTER — OFFICE VISIT (OUTPATIENT)
Dept: MEDICAL GROUP | Age: 43
End: 2018-03-19
Payer: COMMERCIAL

## 2018-03-19 VITALS
SYSTOLIC BLOOD PRESSURE: 115 MMHG | DIASTOLIC BLOOD PRESSURE: 70 MMHG | HEART RATE: 78 BPM | HEIGHT: 62 IN | TEMPERATURE: 97.7 F | WEIGHT: 127.2 LBS | OXYGEN SATURATION: 94 % | BODY MASS INDEX: 23.41 KG/M2

## 2018-03-19 DIAGNOSIS — Z00.00 BLOOD TESTS FOR ROUTINE GENERAL PHYSICAL EXAMINATION: ICD-10-CM

## 2018-03-19 DIAGNOSIS — Z13.1 SCREENING FOR DIABETES MELLITUS: ICD-10-CM

## 2018-03-19 DIAGNOSIS — M25.512 ACUTE PAIN OF LEFT SHOULDER: ICD-10-CM

## 2018-03-19 DIAGNOSIS — Z13.220 SCREENING FOR LIPID DISORDERS: ICD-10-CM

## 2018-03-19 DIAGNOSIS — Z13.0 SCREENING FOR DEFICIENCY ANEMIA: ICD-10-CM

## 2018-03-19 PROCEDURE — 99204 OFFICE O/P NEW MOD 45 MIN: CPT | Performed by: INTERNAL MEDICINE

## 2018-03-19 RX ORDER — NAPROXEN 500 MG/1
500 TABLET ORAL 2 TIMES DAILY WITH MEALS
Qty: 60 TAB | Refills: 3 | Status: SHIPPED | OUTPATIENT
Start: 2018-03-19 | End: 2018-12-20

## 2018-03-19 ASSESSMENT — PAIN SCALES - GENERAL: PAINLEVEL: 4=SLIGHT-MODERATE PAIN

## 2018-03-19 ASSESSMENT — PATIENT HEALTH QUESTIONNAIRE - PHQ9: CLINICAL INTERPRETATION OF PHQ2 SCORE: 0

## 2018-03-19 NOTE — LETTER
[facility]  Shy Kaye M.D.  [address]  [place] [address]  Fax: [phone]   Authorization for Release/Disclosure of   Protected Health Information   Name: DIONNE BUCHANAN : 1975 SSN: [id number]   Address: [address] [place] [address]  [place] [address] Phone:    [phone] (home) [phone] (work)   I authorize the entity listed below to release/disclose the PHI below to:   [facility]/Shy Kaye M.D. and Shy Kaye M.D.   Provider or Entity Name:  Dr. Fernandez   Address   [facility]   Phone:  [phone]    Fax:     Reason for request: continuity of care   Information to be released:    [  ] LAST COLONOSCOPY,  including any PATH REPORT and follow-up  [  ] LAST FIT/COLOGUARD RESULT [  ] LAST DEXA  [  ] LAST MAMMOGRAM  [  ] LAST PAP  [  ] LAST LABS [  ] RETINA EXAM REPORT  [  ] IMMUNIZATION RECORDS  [ X ] Release all info      [  ] Check here and initial the line next to each item to release ALL health information INCLUDING  _____ Care and treatment for drug and / or alcohol abuse  _____ HIV testing, infection status, or AIDS  _____ Genetic Testing    DATES OF SERVICE OR TIME PERIOD TO BE DISCLOSED: _____________  I understand and acknowledge that:  * This Authorization may be revoked at any time by you in writing, except if your health information has already been used or disclosed.  * Your health information that will be used or disclosed as a result of you signing this authorization could be re-disclosed by the recipient. If this occurs, your re-disclosed health information may no longer be protected by State or Federal laws.  * You may refuse to sign this Authorization. Your refusal will not affect your ability to obtain treatment.  * This Authorization becomes effective upon signing and will  on (date) __________.      If no date is indicated, this Authorization will  one (1) year from the signature date.    Name: Dionne Buchanan    Signature:   Date:     3/19/2018            PLEASE FAX REQUESTED RECORDS BACK TO: [phone]

## 2018-03-19 NOTE — ASSESSMENT & PLAN NOTE
This is a new problem. Patient reported that she is left-handed. She is working at [employer] and she needs to push or pull heavy stuff.  Patient reported having left before meals joint pain and shoulder pain started 2 weeks ago. Pain is intermittent, dull in nature, improved with rest. Pain increases by lying on the left side and repetitive movement. She denied abdominal pain or history of peptic ulcer disease or stomach bleeding. She has not tried any medication yet. We discussed to try anti-inflammation medication such as naproxen, stretching exercise and cold pack. Patient agreed to try conservative treatment. She declined to have shoulder x-ray.

## 2018-03-19 NOTE — PROGRESS NOTES
Venecia Buchanan is a 42 y.o. female here to establish care and the evaluation and management of:      HPI:    Acute pain of left shoulder  This is a new problem. Patient reported that she is left-handed. She is working at [employer] and she needs to push or pull heavy stuff.  Patient reported having left before meals joint pain and shoulder pain started 2 weeks ago. Pain is intermittent, dull in nature, improved with rest. Pain increases by lying on the left side and repetitive movement. She denied abdominal pain or history of peptic ulcer disease or stomach bleeding. She has not tried any medication yet. We discussed to try anti-inflammation medication such as naproxen, stretching exercise and cold pack. Patient agreed to try conservative treatment. She declined to have shoulder x-ray.    Current medicines (including changes today)  Current Outpatient Prescriptions   Medication Sig Dispense Refill   • vitamin D (CHOLECALCIFEROL) 1000 UNIT Tab Take 1,000 Units by mouth every day.     • naproxen (NAPROSYN) 500 MG Tab Take 1 Tab by mouth 2 times a day, with meals. 60 Tab 3     No current facility-administered medications for this visit.      She  has no past medical history on file.  She  has a past surgical history that includes primary c section (3/24/2011).  Social History   Substance Use Topics   • Smoking status: Never Smoker   • Smokeless tobacco: Never Used   • Alcohol use No     Social History     Social History Narrative   • No narrative on file     Family History   Problem Relation Age of Onset   • Diabetes Mother    • Hypertension Mother    • Cancer Maternal Grandfather 60     COLON   • Hypertension Father    • Cancer Paternal Aunt 50     breast cancer     Family Status   Relation Status   • Mother Alive   • Maternal Grandfather    • Paternal Grandmother    • Father Alive   • Brother Alive   • Paternal Aunt    • Maternal Grandmother    • Paternal Grandfather    •  "Brother Alive   • Brother Alive     Health Maintenance Topics with due status: Overdue       Topic Date Due    MAMMOGRAM 01/03/2018         ROS    Gen.: Denied weight change, appetite change, fatigue.  ENT: Denied sinus tenderness, nasal congestion, runny nose, or sore throat  CVS: Denied chest pain, palpitations, legs swelling.  Respiratory: Denied cough, shortness of breath, wheezing.  GI: Denied abdominal pain, constipation or diarrhea.  Endocrine: Denied temperature intolerance, increased frequency of urination, polyphagia or polydipsia.  Musculoskeletal: Denied back pain or joint pain.    All other systems reviewed and are negative     Objective:     Blood pressure 115/70, pulse 78, temperature 36.5 °C (97.7 °F), height 1.575 m (5' 2\"), weight 57.7 kg (127 lb 3.2 oz), last menstrual period 06/17/2010, SpO2 94 %, not currently breastfeeding. Body mass index is 23.27 kg/m².  Physical Exam:    Constitutional: Well nourished and Well developed, Alert, no distress.  Skin: Warm, dry, good turgor, no rashes in visible areas.  Eye: Equal, round and reactive, conjunctiva clear, lids normal.  ENMT: Lips without lesions, good dentition, oropharynx clear.  Neck: Trachea midline, no masses, no thyromegaly. No cervical or supraclavicular lymphadenopathy.  Respiratory: Unlabored respiratory effort, lungs clear to auscultation, no wheezes, no ronchi.  Cardiovascular: Normal S1, S2, no murmur, no edema.   Abdomen: Soft, non distended, non-tender, no masses, no hepatosplenomegaly. Bowel sound normal.  Extremities: No edema, no clubbing, no cyanosis.  Psych: Alert and oriented x3, normal affect and mood.  Musculoskeletal exam: Mild tender on before meals joint. Range of motion of movement of both shoulders are within normal.        Assessment and Plan:   The following treatment plan was discussed       1. Acute pain of left shoulder  - Discussed conservative treatment with patient. She would like to try naproxen. I advised " patient to take naproxen with meals and take it twice a day as needed only. Patient is advised to stop taking naproxen or any NSAIDs. If she has abdominal pain or black tarry stool, bloody bowel movement.  - We also discussed to apply ice pad twice a day and avoid repetitive movement on left shoulder, avoid lying on the left side.  - Demonstrated and discussed to do stretching exercises on the shoulders and neck.  - Advised patient to return to clinic if her symptoms do not improve. She declined to have shoulder x-ray currently.  - Consider physical therapy in future if she does not feel better with conservative treatment.  - naproxen (NAPROSYN) 500 MG Tab; Take 1 Tab by mouth 2 times a day, with meals.  Dispense: 60 Tab; Refill: 3    2. Blood tests for routine general physical examination  - CBC WITH DIFFERENTIAL; Future  - COMP METABOLIC PANEL; Future  - LIPID PROFILE; Future    3. Screening for lipid disorders  - Advised to eat low fat, low carbohydrate and high fiber diet as well as do cardio physical exercise regularly.   - LIPID PROFILE; Future    4. Screening for diabetes mellitus  - Patient has history of gestational diabetes. Will check CMP.  - COMP METABOLIC PANEL; Future    5. Screening for deficiency anemia  - Recheck CBC in next year for routine physical exam.  - CBC WITH DIFFERENTIAL; Future        Records requested.  Followup: Return in about 1 year (around 3/19/2019), or if symptoms worsen or fail to improve, for annual physical exam. sooner should new symptoms or problems arise.      Please note that this dictation was created using voice recognition software. I have made every reasonable attempt to correct obvious errors, but I expect that there may have unintended errors in text, spelling, punctuation, or grammar that I did not discover.

## 2018-03-19 NOTE — LETTER
[facility]  Shy Kaye M.D.  [address]  [place] [address]  Fax: [phone]   Authorization for Release/Disclosure of   Protected Health Information   Name: VENECIA BUCHANAN : 1975 SSN: [id number]   Address: [facility] [address]  [place] [address] Phone:    [phone] (home) [phone] (work)   I authorize the entity listed below to release/disclose the PHI below to:   [facility]/Shy Kaye M.D. and Shy Kaye M.D.   Provider or Entity Name:  [facility]   Address   [facility], [facility], [facility], [place]   Phone:  [phone]    Fax:     Reason for request: continuity of care   Information to be released:    [  ] LAST COLONOSCOPY,  including any PATH REPORT and follow-up  [  ] LAST FIT/COLOGUARD RESULT [  ] LAST DEXA  [ XX ] LAST MAMMOGRAM  [  ] LAST PAP  [  ] LAST LABS [  ] RETINA EXAM REPORT  [  ] IMMUNIZATION RECORDS  [  ] Release all info      [  ] Check here and initial the line next to each item to release ALL health information INCLUDING  _____ Care and treatment for drug and / or alcohol abuse  _____ HIV testing, infection status, or AIDS  _____ Genetic Testing    DATES OF SERVICE OR TIME PERIOD TO BE DISCLOSED: _____________  I understand and acknowledge that:  * This Authorization may be revoked at any time by you in writing, except if your health information has already been used or disclosed.  * Your health information that will be used or disclosed as a result of you signing this authorization could be re-disclosed by the recipient. If this occurs, your re-disclosed health information may no longer be protected by State or Federal laws.  * You may refuse to sign this Authorization. Your refusal will not affect your ability to obtain treatment.  * This Authorization becomes effective upon signing and will  on (date) __________.      If no date is indicated, this Authorization will  one (1) year from the signature date.    Name: Venecia Buchanan    Signature:   Date:          3/19/2018       PLEASE FAX REQUESTED RECORDS BACK TO: [phone]

## 2018-08-27 ENCOUNTER — OFFICE VISIT (OUTPATIENT)
Dept: MEDICAL GROUP | Age: 43
End: 2018-08-27
Payer: COMMERCIAL

## 2018-08-27 VITALS
HEIGHT: 62 IN | OXYGEN SATURATION: 98 % | TEMPERATURE: 99.1 F | HEART RATE: 74 BPM | SYSTOLIC BLOOD PRESSURE: 104 MMHG | WEIGHT: 130.6 LBS | DIASTOLIC BLOOD PRESSURE: 56 MMHG | BODY MASS INDEX: 24.03 KG/M2

## 2018-08-27 DIAGNOSIS — L30.9 DERMATITIS OF NIPPLE: ICD-10-CM

## 2018-08-27 DIAGNOSIS — N63.42 UNSPECIFIED LUMP IN LEFT BREAST, SUBAREOLAR: ICD-10-CM

## 2018-08-27 PROCEDURE — 99214 OFFICE O/P EST MOD 30 MIN: CPT | Performed by: INTERNAL MEDICINE

## 2018-08-27 RX ORDER — TRIAMCINOLONE ACETONIDE 1 MG/G
1 CREAM TOPICAL 2 TIMES DAILY
Qty: 1 TUBE | Refills: 1 | Status: SHIPPED | OUTPATIENT
Start: 2018-08-27 | End: 2018-12-12

## 2018-08-27 ASSESSMENT — PAIN SCALES - GENERAL: PAINLEVEL: NO PAIN

## 2018-08-27 NOTE — PROGRESS NOTES
"Subjective:   Venecia Buchanan is a 42 y.o. female here today for evaluation and management of:      Unspecified lump in left breast, subareolar  Patient reported that she has a lump on left breast around the nipple started 1 week ago.  She noticed itchiness initially and then followed by an lump on left nipple.  She has normal mammogram on 1/10/18 at [facility].  She also has normal mammogram in January 2017.  Patient stated that she breast-fed her 2 children in the past.  She denied smoking.  She denied taking estrogen hormone supplements.  She only sexually active with her .  She stated that her paternal aunt had breast cancer at age 46, but no other family members have breast cancer.  The size of lump is the same since noticed 1 week ago.         Current medicines (including changes today)  Current Outpatient Prescriptions   Medication Sig Dispense Refill   • triamcinolone acetonide (KENALOG) 0.1 % Cream Apply 1 Application to affected area(s) 2 times a day. 1 Tube 1   • vitamin D (CHOLECALCIFEROL) 1000 UNIT Tab Take 1,000 Units by mouth every day.     • naproxen (NAPROSYN) 500 MG Tab Take 1 Tab by mouth 2 times a day, with meals. 60 Tab 3     No current facility-administered medications for this visit.      She  has no past medical history on file.    ROS   No chest pain, no shortness of breath, no abdominal pain       Objective:     Blood pressure 104/56, pulse 74, temperature 37.3 °C (99.1 °F), height 1.575 m (5' 2\"), weight 59.2 kg (130 lb 9.6 oz), last menstrual period 08/23/2018, SpO2 98 %, not currently breastfeeding. Body mass index is 23.89 kg/m².   Physical Exam:  General: Alert, oriented and no acute distress.  Eye contact is good, speech goal directed, affect calm  HEENT: conjunctiva non-injected, sclera non-icteric.  Oral mucous membranes pink and moist with no lesions.  Pinna normal.   Lungs: Normal respiratory effort, clear to auscultation bilaterally with good excursion.  CV: regular " rate and rhythm. No murmurs. No carotid bruits.  Abdomen: soft, non distended, nontender, No CVAT, Bowel sound normal.  Ext: no edema, color normal, vascularity normal, temperature normal  Breast exam: Palpable lump on left irregular at 10 o'clock position. Lump is about 0.5 cm in diameter with mild inflammation on the overlying skin.  Nontender on palpation of the lump.  Reported mild itchiness on the lump.  No palpable lump on right breast.  No palpable axillary lymph nodes bilaterally or supraclavicular lymph node bilaterally.  No palpable cervical lymph nodes.      Assessment and Plan:   The following treatment plan was discussed     1. Unspecified lump in left breast, subareolar  - Discussed possible causes including potential Paget's disease.  Ordered diagnostic mammogram for further evaluation.  Patient is advised to schedule mammogram as soon as possible.  Will advise for mammogram report.  - MA-UNILAT DIAGNOSTIC MAMMO W/ CAD LEFT; Future    2. Dermatitis of nipple  - Prescribed triamcinolone cream to apply twice a day as needed on the itchy skin over the lump.  She can apply Neosporin twice a day on the lump as well.  Patient will have diagnostic mammogram first before applying steroid cream.  - triamcinolone acetonide (KENALOG) 0.1 % Cream; Apply 1 Application to affected area(s) 2 times a day.  Dispense: 1 Tube; Refill: 1        Followup: Return in about 1 year (around 8/27/2019), or if symptoms worsen or fail to improve, for Annual physical exam.      Please note that this dictation was created using voice recognition software. I have made every reasonable attempt to correct obvious errors, but I expect that there may have unintended errors in text, spelling, punctuation, or grammar that I did not discover.

## 2018-08-27 NOTE — ASSESSMENT & PLAN NOTE
Patient reported that she has a lump on left breast around the nipple started 1 week ago.  She noticed itchiness initially and then followed by an lump on left nipple.  She has normal mammogram on 1/10/18 at [email or web address].  She also has normal mammogram in January 2017.  Patient stated that she breast-fed her 2 children in the past.  She denied smoking.  She denied taking estrogen hormone supplements.  She only sexually active with her .  She stated that her paternal aunt had breast cancer at age 46, but no other family members have breast cancer.  The size of lump is the same since noticed 1 week ago.

## 2018-08-29 ENCOUNTER — TELEPHONE (OUTPATIENT)
Dept: MEDICAL GROUP | Age: 43
End: 2018-08-29

## 2018-08-29 DIAGNOSIS — N63.42 UNSPECIFIED LUMP IN LEFT BREAST, SUBAREOLAR: ICD-10-CM

## 2018-08-30 DIAGNOSIS — N63.20 LEFT BREAST LUMP: ICD-10-CM

## 2018-08-30 NOTE — TELEPHONE ENCOUNTER
We received a call from [facility] diagnostic radiologist department to order ultrasound for left breast for additional evaluation for breast lump on left side.  Ultrasound left breast was ordered today.    Shy Kaye M.D.

## 2018-10-02 ENCOUNTER — TELEPHONE (OUTPATIENT)
Dept: MEDICAL GROUP | Age: 43
End: 2018-10-02

## 2018-10-02 NOTE — TELEPHONE ENCOUNTER
1. Caller Name: Venecia Buchanan                                           Call Back Number: [phone] (home)         Patient approves a detailed voicemail message: no    Patient called and left voicemail about a follow up for Ultrasound on breast. I called patient back and LVM to get more clarification because US has not been done.

## 2018-10-03 NOTE — TELEPHONE ENCOUNTER
Phone Number Called: [phone] (home)       Message: Called spoke with patient in regards to Dr. Kaye message. Patient is aware of the results and requested to have the order mailed to her home.will place order in mail today for patient.     Left Message for patient to call back: no

## 2018-10-03 NOTE — TELEPHONE ENCOUNTER
"I sent patient my chart message regarding left breast ultrasound done on 8/29/18.    I also tried to call her on 10/2/18 but no one answered the phone.    So please relay the following message to patient if she has not seen her my chart message.    \"Recent left breast ultrasound report done on 8/29/18 showed that you have sebaceous cyst around the nipple area at 10 o'clock position which is consistent with the lump that we found on exam.  Sebaceous cyst is benign.  There are also incidental finding of 2 benign lesions on left breast at 12 o'clock position which are probably fibroadenoma.  Radiologist recommended to repeat ultrasound in 6 month to follow-up on stability of benign lesion.     I ordered ultrasound left breast on 8/29/18 and please come to  order at [address] [place] [address] office at your convenient time.  You will need to do left breast ultrasound in February 2018 for 6 months follow-up.    You do not need to do left breast ultrasound currently.  You need to wait for 6 months to repeat it again.  You just need to come to the office to  the order as you are using [facility] for your ultrasound.\"      Shy Kaye M.D.            "

## 2018-12-12 ENCOUNTER — OFFICE VISIT (OUTPATIENT)
Dept: MEDICAL GROUP | Age: 43
End: 2018-12-12
Payer: COMMERCIAL

## 2018-12-12 VITALS
BODY MASS INDEX: 23.92 KG/M2 | OXYGEN SATURATION: 99 % | WEIGHT: 130 LBS | HEART RATE: 73 BPM | HEIGHT: 62 IN | DIASTOLIC BLOOD PRESSURE: 70 MMHG | SYSTOLIC BLOOD PRESSURE: 130 MMHG | TEMPERATURE: 97.8 F

## 2018-12-12 DIAGNOSIS — R07.9 LEFT SIDED CHEST PAIN: ICD-10-CM

## 2018-12-12 PROBLEM — R07.89 LEFT-SIDED CHEST WALL PAIN: Status: ACTIVE | Noted: 2018-12-12

## 2018-12-12 PROCEDURE — 99214 OFFICE O/P EST MOD 30 MIN: CPT | Performed by: INTERNAL MEDICINE

## 2018-12-12 PROCEDURE — 93000 ELECTROCARDIOGRAM COMPLETE: CPT | Performed by: INTERNAL MEDICINE

## 2018-12-12 NOTE — ASSESSMENT & PLAN NOTE
This is a new problem.  Patient reported pain on left side of the chest for 1 week.  She stated that her pain is burning and tingling or sharp pain on left side of the chest wall and lasted for 2-5 minutes.  Pain improved after pressing her chest with her hand.  She denied shortness of breath or anorexia or nausea or rash on her skin over the chest wall.  She denies fever or chills.  She reported mild cough started yesterday.    She has left diagnostic mammogram and ultrasound in September 2018 showed sebaceous cyst and 2 possible fibroadenoma lesion on the left breast.  She has follow-up diagnostic mammogram and ultrasound scheduled in March 2019.

## 2018-12-12 NOTE — PROGRESS NOTES
"Subjective:   Venecia Buchanan is a 42 y.o. female here today for evaluation and management of:      Left sided chest pain  This is a new problem.  Patient reported pain on left side of the chest for 1 week.  She stated that her pain is burning and tingling or sharp pain on left side of the chest wall and lasted for 2-5 minutes.  Pain improved after pressing her chest with her hand.  She denied shortness of breath or anorexia or nausea or rash on her skin over the chest wall.  She denies fever or chills.  She reported mild cough started yesterday.    She has left diagnostic mammogram and ultrasound in September 2018 showed sebaceous cyst and 2 possible fibroadenoma lesion on the left breast.  She has follow-up diagnostic mammogram and ultrasound scheduled in March 2019.         Current medicines (including changes today)  Current Outpatient Prescriptions   Medication Sig Dispense Refill   • vitamin D (CHOLECALCIFEROL) 1000 UNIT Tab Take 1,000 Units by mouth every day.     • naproxen (NAPROSYN) 500 MG Tab Take 1 Tab by mouth 2 times a day, with meals. 60 Tab 3     No current facility-administered medications for this visit.      She  has no past medical history on file.    ROS   No chest pain, no shortness of breath, no abdominal pain       Objective:     Blood pressure 130/70, pulse 73, temperature 36.6 °C (97.8 °F), temperature source Temporal, height 1.575 m (5' 2\"), weight 59 kg (130 lb), last menstrual period 11/30/2018, SpO2 99 %, not currently breastfeeding. Body mass index is 23.78 kg/m².   Physical Exam:  General: Alert, oriented and no acute distress.  Eye contact is good, speech goal directed, affect calm  HEENT: conjunctiva non-injected, sclera non-icteric.  Oral mucous membranes pink and moist with no lesions.  Pinna normal.   Lungs: Normal respiratory effort, clear to auscultation bilaterally with good excursion.  CV: regular rate and rhythm. No murmurs.   Abdomen: soft, non distended, nontender, Bowel " sound normal.  Ext: no edema, color normal, vascularity normal, temperature normal  Musculoskeletal exam: Nontender on palpation of the left chest wall and no rash on her skin over the chest wall and back.  Breast exam: No palpable lumps on both breast and no palpable cervical lymph nodes or axillary lymph nodes bilaterally.  No nipple discharge or retraction.  Nontender on palpation of the breast tissue.    EKG was done in clinic today and interpreted by me.  Sinus rhythm, heart rate 70, vertical axis, , no ST or T changes, no Q waves, no old EKG to be compared.    Assessment and Plan:   The following treatment plan was discussed     1. Left sided chest pain  -No signs of acute coronary syndrome on EKG in clinic today.  She has atypical chest pain and it is likely due to musculoskeletal pain or not pinching.  Demonstrate and discussed how to do stretching exercise on her neck, shoulders and back.  -Patient is advised to take naproxen 500 mg 1 tablet twice a day with meal for 5 days.  She is advised to take Prilosec together with naproxen to prevent stomach irritation.  -Patient is advised to monitor her symptoms and advised to return to clinic if she has any rash appears on her chest wall or back.  I do not see any blister on exam to diagnosed as shingles.  She will continue to monitor.  -Patient is advised to increase water intake.  -Order chest x-ray for further evaluation as she reported having mild cough started yesterday.  - DX-CHEST-2 VIEWS; Future  - EKG; Future    - Discussed ED precautions with patient: seek emergency evaluation for symptoms including but not limited to: worsening symptoms or developing any new symptoms, crushing chest pain, chest pain associated with difficulty breathing, nausea, or sweats, heart rate irregular or too fast to count.   - Any change or worsening of signs or symptoms, patient encouraged to follow-up or report to emergency room for further evaluation. Patient  understands and agrees    Followup: Return in about 3 months (around 3/12/2019), or if symptoms worsen or fail to improve, for Annual physical exam.      Please note that this dictation was created using voice recognition software. I have made every reasonable attempt to correct obvious errors, but I expect that there may have unintended errors in text, spelling, punctuation, or grammar that I did not discover.

## 2018-12-17 ENCOUNTER — TELEPHONE (OUTPATIENT)
Dept: MEDICAL GROUP | Age: 43
End: 2018-12-17

## 2018-12-18 NOTE — TELEPHONE ENCOUNTER
Patient called and left voicemail asking us to call her back with no details.    Called back and left voicemail to call back.

## 2018-12-20 ENCOUNTER — OFFICE VISIT (OUTPATIENT)
Dept: MEDICAL GROUP | Age: 43
End: 2018-12-20
Payer: COMMERCIAL

## 2018-12-20 VITALS
SYSTOLIC BLOOD PRESSURE: 122 MMHG | DIASTOLIC BLOOD PRESSURE: 84 MMHG | BODY MASS INDEX: 23.74 KG/M2 | WEIGHT: 129 LBS | HEIGHT: 62 IN | OXYGEN SATURATION: 99 % | TEMPERATURE: 98.7 F | HEART RATE: 96 BPM

## 2018-12-20 DIAGNOSIS — F51.01 PRIMARY INSOMNIA: ICD-10-CM

## 2018-12-20 DIAGNOSIS — F41.1 GAD (GENERALIZED ANXIETY DISORDER): ICD-10-CM

## 2018-12-20 PROCEDURE — 99214 OFFICE O/P EST MOD 30 MIN: CPT | Performed by: INTERNAL MEDICINE

## 2018-12-21 NOTE — PROGRESS NOTES
Subjective:   Venecia Buchanan is a 43 y.o. female here today for evaluation and management of:      JANICE (generalized anxiety disorder)  Patient presented to clinic for discussion of her anxiety and sleep problems.  Patient reported that she has generalized anxiety disorder with worries all the time.  She is not able to sleep at night due to her over thinking and worry.  She has that problem over a year.  She does not want to take medication for her anxiety and insomnia as she wants to try naturally to control her mood.  She never has history of depression or bipolar or panic attack.  She denies suicidal ideation or plan or homicidal ideation or plan.  She stated that her previous PCP provides a work letter for her not to work overtime due to her anxiety and high stress.    Patient stated that she feels stressed if she works more than 8 hours a day and she has more anxiety attack if she works overtime.  She is not able to have enough rest and sleep if she works overtime.  Her body will be extremely tired and exhausted.    Primary insomnia  Patient has difficulty to fall asleep at night.  She stated that she sometimes not able to fall asleep after lying in bed for 2-3 hours.  Patient did not want to take prescription medication.  She preferred to try to sleep naturally.  However she has difficulty to train her mind to calm down and sleep if she works overtime.  Patient requests to have [facility] letter for her anxiety and insomnia.         Current medicines (including changes today)  Current Outpatient Prescriptions   Medication Sig Dispense Refill   • vitamin D (CHOLECALCIFEROL) 1000 UNIT Tab Take 1,000 Units by mouth every day.       No current facility-administered medications for this visit.      She  has no past medical history on file.    ROS   No chest pain, no shortness of breath, no abdominal pain       Objective:     Blood pressure 122/84, pulse 96, temperature 37.1 °C (98.7 °F), temperature source Temporal,  "height 1.575 m (5' 2\"), weight 58.5 kg (129 lb), last menstrual period 11/30/2018, SpO2 99 %, not currently breastfeeding. Body mass index is 23.59 kg/m².   Physical Exam:  General: Alert, oriented and no acute distress.  Eye contact is good, speech goal directed, affect calm  HEENT: conjunctiva non-injected, sclera non-icteric.  Oral mucous membranes pink and moist with no lesions.  Pinna normal.   Lungs: Normal respiratory effort, clear to auscultation bilaterally with good excursion.  CV: regular rate and rhythm. No murmurs.  Abdomen: soft, non distended, nontender, Bowel sound normal.  Ext: no edema, color normal, vascularity normal, temperature normal        Assessment and Plan:   The following treatment plan was discussed     1. JANICE (generalized anxiety disorder)  -Patient does not want to take medication for her anxiety.  She wants to try to control her anxiety with exercise and cognitive behavioral treatment.  I discussed with patient how to calm down her mood and coping her stress.  I discussed with patient to do yoga, meditation and mindfulness exercise.  Patient is encouraged to do cardio physical exercise 3-4 times per week.  -Patient declined to refer to psychologist for counseling.  -Reviewed and filled [place] letter for patient.  Patient is not recommended to work overtime as she will have more anxiety attack, may decrease sleep hours and probably may deteriorate her health condition.     2. Primary insomnia  -Discussed sleep hygiene. Go to bed and wake up at consistent times whether work/school day or not. Keep room dark, quiet, and comfortable. Don't nap in late afternoon and don't nap more than an hour. Increase exposure to sunlight during awake times and avoid bright lights before going to sleep. Avoid stimulant or caffeine use more than 4 hours after wake time.   -Discussed to try melatonin from over-the-counter.    3. Health Maintenance   -Patient declined to receive influenza vaccine even after " discussion of importance of immunization.    Followup: Return in about 4 months (around 4/15/2019), or if symptoms worsen or fail to improve, for Anxiety, Insomnia, Lab review.      Please note that this dictation was created using voice recognition software. I have made every reasonable attempt to correct obvious errors, but I expect that there may have unintended errors in text, spelling, punctuation, or grammar that I did not discover.

## 2019-02-15 ENCOUNTER — TELEPHONE (OUTPATIENT)
Dept: MEDICAL GROUP | Age: 44
End: 2019-02-15

## 2019-02-15 NOTE — TELEPHONE ENCOUNTER
Phone Number Called: [phone] (home)       Message: informed patient    Left Message for patient to call back: no

## 2019-04-10 ENCOUNTER — TELEPHONE (OUTPATIENT)
Dept: MEDICAL GROUP | Age: 44
End: 2019-04-10

## 2019-04-10 DIAGNOSIS — Z00.00 BLOOD TESTS FOR ROUTINE GENERAL PHYSICAL EXAMINATION: ICD-10-CM

## 2019-04-10 NOTE — TELEPHONE ENCOUNTER
Please inform patient that blood tests are already ordered today.  Please advise to fast for 10-12-hour prior to blood tests.    Shy Kaye M.D.

## 2019-04-10 NOTE — TELEPHONE ENCOUNTER
Phone Number Called: [phone] (home)       Message: LVM for patient letting her know of message below.     Left Message for patient to call back: N\A

## 2019-04-10 NOTE — TELEPHONE ENCOUNTER
Good morning Venecia Joiner would like to please have lab orders prior to her appointment with you next week.    Have a good day.  Thank you,  Hina

## 2019-04-11 ENCOUNTER — HOSPITAL ENCOUNTER (OUTPATIENT)
Dept: LAB | Facility: MEDICAL CENTER | Age: 44
End: 2019-04-11
Attending: INTERNAL MEDICINE
Payer: COMMERCIAL

## 2019-04-11 DIAGNOSIS — Z00.00 BLOOD TESTS FOR ROUTINE GENERAL PHYSICAL EXAMINATION: ICD-10-CM

## 2019-04-11 LAB
BASOPHILS # BLD AUTO: 0.4 % (ref 0–1.8)
BASOPHILS # BLD: 0.02 K/UL (ref 0–0.12)
EOSINOPHIL # BLD AUTO: 0.18 K/UL (ref 0–0.51)
EOSINOPHIL NFR BLD: 3.9 % (ref 0–6.9)
ERYTHROCYTE [DISTWIDTH] IN BLOOD BY AUTOMATED COUNT: 45 FL (ref 35.9–50)
HCT VFR BLD AUTO: 41.7 % (ref 37–47)
HGB BLD-MCNC: 12.9 G/DL (ref 12–16)
IMM GRANULOCYTES # BLD AUTO: 0 K/UL (ref 0–0.11)
IMM GRANULOCYTES NFR BLD AUTO: 0 % (ref 0–0.9)
LYMPHOCYTES # BLD AUTO: 1.58 K/UL (ref 1–4.8)
LYMPHOCYTES NFR BLD: 34.3 % (ref 22–41)
MCH RBC QN AUTO: 28 PG (ref 27–33)
MCHC RBC AUTO-ENTMCNC: 30.9 G/DL (ref 33.6–35)
MCV RBC AUTO: 90.5 FL (ref 81.4–97.8)
MONOCYTES # BLD AUTO: 0.51 K/UL (ref 0–0.85)
MONOCYTES NFR BLD AUTO: 11.1 % (ref 0–13.4)
NEUTROPHILS # BLD AUTO: 2.31 K/UL (ref 2–7.15)
NEUTROPHILS NFR BLD: 50.3 % (ref 44–72)
NRBC # BLD AUTO: 0 K/UL
NRBC BLD-RTO: 0 /100 WBC
PLATELET # BLD AUTO: 273 K/UL (ref 164–446)
PMV BLD AUTO: 10.3 FL (ref 9–12.9)
RBC # BLD AUTO: 4.61 M/UL (ref 4.2–5.4)
WBC # BLD AUTO: 4.6 K/UL (ref 4.8–10.8)

## 2019-04-11 PROCEDURE — 84443 ASSAY THYROID STIM HORMONE: CPT

## 2019-04-11 PROCEDURE — 80061 LIPID PANEL: CPT

## 2019-04-11 PROCEDURE — 36415 COLL VENOUS BLD VENIPUNCTURE: CPT

## 2019-04-11 PROCEDURE — 80053 COMPREHEN METABOLIC PANEL: CPT

## 2019-04-11 PROCEDURE — 84439 ASSAY OF FREE THYROXINE: CPT

## 2019-04-11 PROCEDURE — 85025 COMPLETE CBC W/AUTO DIFF WBC: CPT

## 2019-04-12 LAB
ALBUMIN SERPL BCP-MCNC: 4.3 G/DL (ref 3.2–4.9)
ALBUMIN/GLOB SERPL: 1.5 G/DL
ALP SERPL-CCNC: 47 U/L (ref 30–99)
ALT SERPL-CCNC: 9 U/L (ref 2–50)
ANION GAP SERPL CALC-SCNC: 7 MMOL/L (ref 0–11.9)
AST SERPL-CCNC: 14 U/L (ref 12–45)
BILIRUB SERPL-MCNC: 0.6 MG/DL (ref 0.1–1.5)
BUN SERPL-MCNC: 15 MG/DL (ref 8–22)
CALCIUM SERPL-MCNC: 8.9 MG/DL (ref 8.5–10.5)
CHLORIDE SERPL-SCNC: 106 MMOL/L (ref 96–112)
CHOLEST SERPL-MCNC: 156 MG/DL (ref 100–199)
CO2 SERPL-SCNC: 27 MMOL/L (ref 20–33)
CREAT SERPL-MCNC: 0.56 MG/DL (ref 0.5–1.4)
FASTING STATUS PATIENT QL REPORTED: NORMAL
GLOBULIN SER CALC-MCNC: 2.8 G/DL (ref 1.9–3.5)
GLUCOSE SERPL-MCNC: 84 MG/DL (ref 65–99)
HDLC SERPL-MCNC: 46 MG/DL
LDLC SERPL CALC-MCNC: 101 MG/DL
POTASSIUM SERPL-SCNC: 4.1 MMOL/L (ref 3.6–5.5)
PROT SERPL-MCNC: 7.1 G/DL (ref 6–8.2)
SODIUM SERPL-SCNC: 140 MMOL/L (ref 135–145)
T4 FREE SERPL-MCNC: 0.86 NG/DL (ref 0.53–1.43)
TRIGL SERPL-MCNC: 46 MG/DL (ref 0–149)
TSH SERPL DL<=0.005 MIU/L-ACNC: 0.78 UIU/ML (ref 0.38–5.33)

## 2019-04-15 ENCOUNTER — OFFICE VISIT (OUTPATIENT)
Dept: MEDICAL GROUP | Age: 44
End: 2019-04-15
Payer: COMMERCIAL

## 2019-04-15 VITALS
SYSTOLIC BLOOD PRESSURE: 124 MMHG | HEART RATE: 65 BPM | DIASTOLIC BLOOD PRESSURE: 68 MMHG | OXYGEN SATURATION: 100 % | HEIGHT: 62 IN | WEIGHT: 129.8 LBS | TEMPERATURE: 98.7 F | BODY MASS INDEX: 23.89 KG/M2

## 2019-04-15 DIAGNOSIS — F51.01 PRIMARY INSOMNIA: ICD-10-CM

## 2019-04-15 DIAGNOSIS — Z00.00 ROUTINE GENERAL MEDICAL EXAMINATION AT HEALTH CARE FACILITY: ICD-10-CM

## 2019-04-15 DIAGNOSIS — F41.1 GAD (GENERALIZED ANXIETY DISORDER): ICD-10-CM

## 2019-04-15 PROBLEM — R07.9 LEFT SIDED CHEST PAIN: Status: RESOLVED | Noted: 2018-12-12 | Resolved: 2019-04-15

## 2019-04-15 PROBLEM — M25.512 ACUTE PAIN OF LEFT SHOULDER: Status: RESOLVED | Noted: 2018-03-19 | Resolved: 2019-04-15

## 2019-04-15 PROCEDURE — 99396 PREV VISIT EST AGE 40-64: CPT | Performed by: INTERNAL MEDICINE

## 2019-04-15 ASSESSMENT — PAIN SCALES - GENERAL: PAINLEVEL: NO PAIN

## 2019-04-15 ASSESSMENT — PATIENT HEALTH QUESTIONNAIRE - PHQ9: CLINICAL INTERPRETATION OF PHQ2 SCORE: 0

## 2019-04-15 NOTE — LETTER
[facility]  Shy Kaye M.D.  [address]  [place] [address]  Fax: [phone]   Authorization for Release/Disclosure of   Protected Health Information   Name: VENECIA MONTEZ : 1975 SSN: [id number]   Address: [address] [place] [address] Phone:    [phone] (home)    I authorize the entity listed below to release/disclose the PHI below to:   [facility]/Shy Kaye M.D. and Shy Kaye M.D.   Provider or Entity Name:   Lola Henderson MD   Address   [facility], [place] [address] [facility], [address], [place] [address] Phone: [phone]      Fax:     Reason for request: continuity of care   Information to be released:    [  ] LAST COLONOSCOPY,  including any PATH REPORT and follow-up  [  ] LAST FIT/COLOGUARD RESULT [  ] LAST DEXA  [  ] LAST MAMMOGRAM  [XXX] LAST PAP  [  ] LAST LABS [  ] RETINA EXAM REPORT  [  ] IMMUNIZATION RECORDS  [  ] Release all info      [  ] Check here and initial the line next to each item to release ALL health information INCLUDING  _____ Care and treatment for drug and / or alcohol abuse  _____ HIV testing, infection status, or AIDS  _____ Genetic Testing    DATES OF SERVICE OR TIME PERIOD TO BE DISCLOSED: _____________  I understand and acknowledge that:  * This Authorization may be revoked at any time by you in writing, except if your health information has already been used or disclosed.  * Your health information that will be used or disclosed as a result of you signing this authorization could be re-disclosed by the recipient. If this occurs, your re-disclosed health information may no longer be protected by State or Federal laws.  * You may refuse to sign this Authorization. Your refusal will not affect your ability to obtain treatment.  * This Authorization becomes effective upon signing and will  on (date) __________.      If no date is indicated, this Authorization will  one (1) year from the signature date.    Name: Venecia  Hari Buchanan    Signature:   Date:     4/15/2019       PLEASE FAX REQUESTED RECORDS BACK TO: [phone]

## 2019-04-15 NOTE — PROGRESS NOTES
Chief Complaint:     Venecia Buchanan is a 43 y.o. female who presents for annual exam    Routine general medical examination at health care facility  Overall, the patient is in good health. She denies any acute medical complaints. We discussed her most recent lab results dated 04/11/19.  Fasting glucose was normal. Liver and kidney function is normal. Lipid panel was normal. She is not currently on a statin. Thyroid function is normal.      JANICE (generalized anxiety disorder)  Primary insomnia  She presents today for management of her chronic generalized anxiety and insomnia. She feels her anxiety is stable and is not currently taking any medications for treatment. She is attempting to stay active and is walking twice a week. She is working full time and has not had to take time off due to her anxiety. She is not undergoing counseling. She denies suicidal or homicidal ideations. Her difficulty sleeping is under good control with Melatonin 1 mg as needed.    Pt has GYN provider: yes  Last colonoscopy: N/A  Bone density test: N/A  Gardasil:N\A   Last Td: 2011  Regular exercise: yes     She  has no past medical history on file.  She  has a past surgical history that includes primary c section (3/24/2011).  Current Outpatient Prescriptions   Medication Sig Dispense Refill   • vitamin D (CHOLECALCIFEROL) 1000 UNIT Tab Take 1,000 Units by mouth every day.       No current facility-administered medications for this visit.      Social History   Substance Use Topics   • Smoking status: Never Smoker   • Smokeless tobacco: Never Used   • Alcohol use No       Review Of Systems:  Denies fever, chills, or sweats, unexplained weight changes  Skin: negative for rash, changing moles, abnormal pigmentation, hair or nail changes.  Eyes: negative for visual blurring, double vision, eye pain, floaters and discharge from eyes  Ears/Nose/Throat: negative for tinnitus, vertigo, oral or dental problem, hoarseness, frequent URI's, sinus  "trouble, persistent sore throat  Respiratory: negative for persistent cough, hemoptysis, dyspnea, wheezing  Cardiovascular: negative for palpitations, tachycardia, irregular heart beat, chest pain or pressure or peripheral edema.  Breast: Denies breast tenderness, mass,  changes in size or contour, or abnormal cyclic discomfort.  Gastrointestinal: negative for dysphagia or odynophagia, nausea, heartburn or reflux, abdominal pain, hemorrhoids, constipation or diarrhea, black stool or bloody stool  Genitourinary: negative for nocturia, dysuria, frequency, incontinence, abnormal vaginal discharge, dysparunia or abnormal vaginal bleeding  Musculoskeletal: negative for joint swelling and muscle pain/ soreness  Neurologic: negative for new or changing headaches, new weakness tremor  Psychiatric: negative for sexual difficulties  Hematologic/Lymphatic/Immunologic: negative for pallor, unusual bruising, swollen glands, HIV risk factors  Endocrine: negative for temperature intolerance, polydipsia, polyuria.       PHYSICAL EXAMINATION:  /68 (BP Location: Right arm, Patient Position: Sitting, BP Cuff Size: Adult)   Pulse 65   Temp 37.1 °C (98.7 °F) (Temporal)   Ht 1.575 m (5' 2\")   Wt 58.9 kg (129 lb 12.8 oz)   SpO2 100%   Body mass index is 23.74 kg/m².  Wt Readings from Last 4 Encounters:   04/15/19 58.9 kg (129 lb 12.8 oz)   12/20/18 58.5 kg (129 lb)   12/12/18 59 kg (130 lb)   08/27/18 59.2 kg (130 lb 9.6 oz)       Constitutional: Alert, no distress.  Skin: Warm, dry, good turgor, no rashes or suspicious lesions in visible areas.  Eye: pupils equal, round and reactive to light, conjunctivae clear, lids normal.  ENMT: Lips without lesions, good dentition, oropharynx clear. Pinnae skin normal with no lesions. TM pearly gray with normal light reflex.   Neck: supple, no masses. No anterior or supraclavicular adenopathy. No carotid bruits no thyromegaly.  Respiratory: Unlabored respiratory effort, lungs clear to " auscultation, no wheezes, no ronchi.  Cardiovascular: Normal S1, S2, no murmur, no peripheral edema.  Abdomen: no CVAT abdomen Soft, non-tender, no masses, no hepatosplenomegaly.  Psych: Alert and oriented x3, normal affect and mood.  Musc/Skel: 5/5 strength and normal motion UE and LE proximal and distal.        Reviewed labs and discussed with the patient:   Results for DIONNE MONTEZ (MRN [id number]) as of 4/15/2019 11:45   Ref. Range 4/11/2019 09:51   WBC Latest Ref Range: 4.8 - 10.8 K/uL 4.6 (L)   RBC Latest Ref Range: 4.20 - 5.40 M/uL 4.61   Hemoglobin Latest Ref Range: 12.0 - 16.0 g/dL 12.9   Hematocrit Latest Ref Range: 37.0 - 47.0 % 41.7   MCV Latest Ref Range: 81.4 - 97.8 fL 90.5   MCH Latest Ref Range: 27.0 - 33.0 pg 28.0   MCHC Latest Ref Range: 33.6 - 35.0 g/dL 30.9 (L)   RDW Latest Ref Range: 35.9 - 50.0 fL 45.0   Platelet Count Latest Ref Range: 164 - 446 K/uL 273   MPV Latest Ref Range: 9.0 - 12.9 fL 10.3   Neutrophils-Polys Latest Ref Range: 44.00 - 72.00 % 50.30   Neutrophils (Absolute) Latest Ref Range: 2.00 - 7.15 K/uL 2.31   Lymphocytes Latest Ref Range: 22.00 - 41.00 % 34.30   Lymphs (Absolute) Latest Ref Range: 1.00 - 4.80 K/uL 1.58   Monocytes Latest Ref Range: 0.00 - 13.40 % 11.10   Monos (Absolute) Latest Ref Range: 0.00 - 0.85 K/uL 0.51   Eosinophils Latest Ref Range: 0.00 - 6.90 % 3.90   Eos (Absolute) Latest Ref Range: 0.00 - 0.51 K/uL 0.18   Basophils Latest Ref Range: 0.00 - 1.80 % 0.40   Baso (Absolute) Latest Ref Range: 0.00 - 0.12 K/uL 0.02   Immature Granulocytes Latest Ref Range: 0.00 - 0.90 % 0.00   Immature Granulocytes (abs) Latest Ref Range: 0.00 - 0.11 K/uL 0.00   Nucleated RBC Latest Units: /100 WBC 0.00   NRBC (Absolute) Latest Units: K/uL 0.00   Sodium Latest Ref Range: 135 - 145 mmol/L 140   Potassium Latest Ref Range: 3.6 - 5.5 mmol/L 4.1   Chloride Latest Ref Range: 96 - 112 mmol/L 106   Co2 Latest Ref Range: 20 - 33 mmol/L 27   Anion Gap Latest Ref Range: 0.0 -  11.9  7.0   Glucose Latest Ref Range: 65 - 99 mg/dL 84   Bun Latest Ref Range: 8 - 22 mg/dL 15   Creatinine Latest Ref Range: 0.50 - 1.40 mg/dL 0.56   GFR If  Latest Ref Range: >60 mL/min/1.73 m 2 >60   GFR If Non  Latest Ref Range: >60 mL/min/1.73 m 2 >60   Calcium Latest Ref Range: 8.5 - 10.5 mg/dL 8.9   AST(SGOT) Latest Ref Range: 12 - 45 U/L 14   ALT(SGPT) Latest Ref Range: 2 - 50 U/L 9   Alkaline Phosphatase Latest Ref Range: 30 - 99 U/L 47   Total Bilirubin Latest Ref Range: 0.1 - 1.5 mg/dL 0.6   Albumin Latest Ref Range: 3.2 - 4.9 g/dL 4.3   Total Protein Latest Ref Range: 6.0 - 8.2 g/dL 7.1   Globulin Latest Ref Range: 1.9 - 3.5 g/dL 2.8   A-G Ratio Latest Units: g/dL 1.5   Cholesterol,Tot Latest Ref Range: 100 - 199 mg/dL 156   Triglycerides Latest Ref Range: 0 - 149 mg/dL 46   HDL Latest Ref Range: >=40 mg/dL 46   LDL Latest Ref Range: <100 mg/dL 101 (H)   TSH Latest Ref Range: 0.380 - 5.330 uIU/mL 0.780   Free T-4 Latest Ref Range: 0.53 - 1.43 ng/dL 0.86       ASSESSMENT/PLAN:  The following treatment plan was discussed:    1. Routine general medical examination at health care facility  - Overall in good health with no complaints.  - Recommended she increase her fiber intake for regular bowel movements.  - Menstrual cycle is regular with light vaginal bleeding. No vaginal discharge.  - Labs dated 04/11/19 were normal. TSH normal.   - Lipid panel normal. Not currently on a statin.  - Liver and kidney function were normal.  - Weight is stable. Recommended she follow a low carbohydrate, low fat, low processed sugar, high protein diet. Regular exercise including swimming, running if no joint pain, pilates and/or yoga are recommended.  - Will recheck 1-2 weeks prior to next appointment.  - CBC WITH DIFFERENTIAL; Future  - Comp Metabolic Panel; Future  - Lipid Profile; Future  - TSH; Future  - FREE THYROXINE; Future    2. Primary insomnia  - Chronic, stable history.  - Continue  same regimen of Melatonin 1 mg as needed.   - Sleep hygiene was recommended. She may utilize essential oils.  - CBC WITH DIFFERENTIAL; Future  - Comp Metabolic Panel; Future    3. JANICE (generalized anxiety disorder)  - Chronic, stable history. Not currently taking medications. Does not wish to start today. No referral needed at this time.  - Recommended she exercise regularly including meditation, yoga or pilates.  Counseled how to cope with stress and anxiety.  - CBC WITH DIFFERENTIAL; Future  - Comp Metabolic Panel; Future  - TSH; Future  - FREE THYROXINE; Future      Health Maintenance:  Mammogram was completed on 03/13/19 was normal. She does have dense tissue; however, no evidence of malignancy. Repeat in one year. Advised she complete regular breast exams.    Pap smear is due in 2020. She is sexually active with her . No prior history of abnormal pap smear. No prior or family history of ovarian or uterine cancer.  We will request last Pap smear done last year by gynecologist from [facility].    Follow up in 1 year with repeat labs 1-2 weeks prior to next appointment. Return earlier for new or concerning symptoms.      Labs ordered  Immunizations per orders  Pt counseled about skin care, diet, supplements, and exercise.  Next office visit for recheck of chronic medical conditions is due in  1 year    Please note that this dictation was created using voice recognition software. I have made every reasonable attempt to correct obvious errors, but I expect that there may have unintended errors in text, spelling, punctuation, or grammar that I did not discover.    IVeronica (Scribe), am scribing for, and in the presence of, Shy Kaye M.D..    Electronically signed by: Veronica Garcia (Scribe), 4/15/2019     IShy M.D., personally performed the services described in this documentation, as scribed by Veronica Garcia n my presence, and it is both accurate and complete.

## 2019-04-15 NOTE — PROGRESS NOTES
Obtained signed consent for release of last recent Pap Smear and faxed to the office of Lola Henderson MD.

## 2019-06-27 ENCOUNTER — OFFICE VISIT (OUTPATIENT)
Dept: MEDICAL GROUP | Age: 44
End: 2019-06-27
Payer: COMMERCIAL

## 2019-06-27 VITALS
HEART RATE: 70 BPM | DIASTOLIC BLOOD PRESSURE: 78 MMHG | WEIGHT: 129.8 LBS | HEIGHT: 62 IN | TEMPERATURE: 98.2 F | OXYGEN SATURATION: 100 % | SYSTOLIC BLOOD PRESSURE: 108 MMHG | BODY MASS INDEX: 23.89 KG/M2

## 2019-06-27 DIAGNOSIS — L03.115 CELLULITIS OF RIGHT LOWER EXTREMITY: ICD-10-CM

## 2019-06-27 PROCEDURE — 99214 OFFICE O/P EST MOD 30 MIN: CPT | Performed by: INTERNAL MEDICINE

## 2019-06-27 RX ORDER — SULFAMETHOXAZOLE AND TRIMETHOPRIM 800; 160 MG/1; MG/1
1 TABLET ORAL 2 TIMES DAILY
Qty: 20 TAB | Refills: 0 | Status: SHIPPED | OUTPATIENT
Start: 2019-06-27 | End: 2019-07-07

## 2019-06-27 ASSESSMENT — PAIN SCALES - GENERAL: PAINLEVEL: 7=MODERATE-SEVERE PAIN

## 2019-06-27 NOTE — LETTER
June 27, 2019         Patient: Venecia Buchanan   YOB: 1975   Date of Visit: 6/27/2019           To Whom it May Concern:    Venecia Buchanan was seen in my clinic on 6/27/2019. Please excuse her from work on 6/27/19 and 6/28/19 .    If you have any questions or concerns, please don't hesitate to call.        Sincerely,           Shy Kaye M.D.  Electronically Signed

## 2019-06-27 NOTE — PROGRESS NOTES
"Subjective:   Venecia Buchanan is a 43 y.o. female here today for evaluation and management of:    Cellulitis of right lower extremity  Patient is here today for a boil located just below her right knee onset 1 week ago. She states her symptoms first started as a painful pimple but has progressively worsened. There was purulent drainage to begin with so she tried to squeeze it and since it has only worsened with radiating pain up her right leg.  She noticed swelling and redness over the right leg below the right knee within 5 days.  Patient states that she walks for 8 hours a day at work and is requesting a work note to excuse from work today and tomorrow.  She will have 1 week off next week for vacation.  She denies fever, chills, or joint pain.    Current medicines (including changes today)  Current Outpatient Prescriptions   Medication Sig Dispense Refill   • sulfamethoxazole-trimethoprim (BACTRIM DS) 800-160 MG tablet Take 1 Tab by mouth 2 times a day for 10 days. 20 Tab 0   • vitamin D (CHOLECALCIFEROL) 1000 UNIT Tab Take 1,000 Units by mouth every day.       No current facility-administered medications for this visit.      She  has no past medical history on file.    ROS   No chest pain, no joint pain, no shortness of breath, no abdominal pain       Objective:     /78 (BP Location: Right arm, Patient Position: Sitting, BP Cuff Size: Adult)   Pulse 70   Temp 36.8 °C (98.2 °F) (Temporal)   Ht 1.575 m (5' 2\")   Wt 58.9 kg (129 lb 12.8 oz)   SpO2 100%  Body mass index is 23.74 kg/m².     Physical Exam:  General: Alert, oriented and no acute distress.  Eye contact is good, speech goal directed, affect calm  HEENT: conjunctiva non-injected, sclera non-icteric.  Oral mucous membranes pink and moist with no lesions.  Pinna normal.   Lungs: Normal respiratory effort, clear to auscultation bilaterally with good excursion.  CV: regular rate and rhythm. No murmurs.   Abdomen: soft, non distended, nontender, " Bowel sound normal.  Ext: no edema, color normal, vascularity normal, temperature normal  Musculoskeletal: Erythema and swelling over right leg below right knee. The largest diameter of the margin was 10 x 9 cm. Warm and tender to touch. There is a small bump in the middle of the swelling.  No discharge.    Assessment and Plan:   The following treatment plan was discussed; Follow-up next week.     1. Cellulitis of right lower extremity  - Patient is here for a painful boil onset 1 week ago.  Exam is consistent with cellulitis of her right lower extremity.  No localized abscess to be drained.  Discussed treating her symptoms with antibiotics. She is advised to take Bactrim DS 1 capsule 800-160 mg twice a day for 10 days.   - Discussed the medication side effects and risks and benefits of Bactrim DS with the patient. She is advised to take Probiotics and drink plenty of water while taking antibiotics.   - Recommended patient raise her legs and use a warm compress or heating pad on the area.   - She is recommended to monitor her symptoms and return in 1 week for follow-up. If her swelling and redness do not subside and she develops a high fever, or other symptoms of sepsis she is instructed to immediately go to ER for IV antibiotics and I&D procedure.  Patient stated understanding and agreed with the plan.  - Advised to take Ibuprofen or Tylenol with food for her pain as needed.   - sulfamethoxazole-trimethoprim (BACTRIM DS) 800-160 MG tablet; Take 1 Tab by mouth 2 times a day for 10 days.  Dispense: 20 Tab; Refill: 0      Followup: Next week for wound check.     Please note that this dictation was created using voice recognition software. I have made every reasonable attempt to correct obvious errors, but I expect that there may have unintended errors in text, spelling, punctuation, or grammar that I did not discover.    I, Hallie Kohli (Scribe), am scribing for, and in the presence of, Shy Kaye  M.D..    Electronically signed by: Hallie Kohli (Scribe), 6/27/2019    I, Shy Kaye M.D., personally performed the services described in this documentation, as scribed by Hallie Kohli in my presence, and it is both accurate and complete.

## 2019-07-03 ENCOUNTER — APPOINTMENT (OUTPATIENT)
Dept: MEDICAL GROUP | Age: 44
End: 2019-07-03
Payer: COMMERCIAL

## 2019-12-10 ENCOUNTER — OFFICE VISIT (OUTPATIENT)
Dept: MEDICAL GROUP | Age: 44
End: 2019-12-10
Payer: COMMERCIAL

## 2019-12-10 VITALS
DIASTOLIC BLOOD PRESSURE: 68 MMHG | HEIGHT: 62 IN | TEMPERATURE: 98.1 F | WEIGHT: 131.4 LBS | BODY MASS INDEX: 24.18 KG/M2 | OXYGEN SATURATION: 98 % | HEART RATE: 64 BPM | SYSTOLIC BLOOD PRESSURE: 116 MMHG

## 2019-12-10 DIAGNOSIS — F51.01 PRIMARY INSOMNIA: ICD-10-CM

## 2019-12-10 DIAGNOSIS — F41.1 GAD (GENERALIZED ANXIETY DISORDER): ICD-10-CM

## 2019-12-10 PROBLEM — L03.115 CELLULITIS OF RIGHT LOWER EXTREMITY: Status: RESOLVED | Noted: 2019-06-27 | Resolved: 2019-12-10

## 2019-12-10 PROCEDURE — 99214 OFFICE O/P EST MOD 30 MIN: CPT | Performed by: INTERNAL MEDICINE

## 2019-12-10 ASSESSMENT — PAIN SCALES - GENERAL: PAINLEVEL: NO PAIN

## 2019-12-10 NOTE — PROGRESS NOTES
"Subjective:   Venecia Buchanan is a 43 y.o. female here today for evaluation and management of:    JANICE (generalized anxiety disorder)  Chronic x2 years. Patient is currently attempting to manage her anxiety through lifestyle modifications alone, no current pharmacological treatment. She denies prior use of anti-depressant or medication for anxiety. She works at the [employer] and states that during the holiday season they are working mandatory overtime with 10-12 hours daily for 6 days a week, which increases her stress and anxiety. She reports that she continues to worry constantly, which prevents her from sleeping. She does not have history of depression, bipolar, or panic attack. She denies suicidal ideation, plan, self harm, or homicidal ideation/plan. She lives with her  and kids, who are supportive. FMLA paper work was filled out today in office.    Primary insomnia  Chronic. Patient reports compliancy with melatonin 2 mg QN PRN and denies any associated side effects. She reports she typically uses melatonin 2x weekly with mild-moderate improvement. Today we reviewed proper sleep hygiene and plan to continue current regimen.    Current medicines (including changes today)  Current Outpatient Medications   Medication Sig Dispense Refill   • vitamin D (CHOLECALCIFEROL) 1000 UNIT Tab Take 1,000 Units by mouth every day.       No current facility-administered medications for this visit.      She  has no past medical history on file.    ROS   No chest pain, no shortness of breath, no abdominal pain       Objective:     /68 (BP Location: Left arm, Patient Position: Sitting, BP Cuff Size: Adult)   Pulse 64   Temp 36.7 °C (98.1 °F) (Temporal)   Ht 1.575 m (5' 2\")   Wt 59.6 kg (131 lb 6.4 oz)   SpO2 98%  Body mass index is 24.03 kg/m².   Physical Exam:  General: Alert, oriented and no acute distress.  Eye contact is good, speech goal directed, affect calm  HEENT: conjunctiva non-injected, sclera " non-icteric.  Oral mucous membranes pink and moist with no lesions.  Pinna normal.   Lungs: Normal respiratory effort, clear to auscultation bilaterally with good excursion.  CV: regular rate and rhythm. No murmurs.   Abdomen: soft, non distended, nontender, Bowel sound normal.  Ext: no edema, color normal, vascularity normal, temperature normal  Musculoskeletal exam: moving all extremities freely.      Assessment and Plan:   The following treatment plan was discussed:    1. JANICE (generalized anxiety disorder)  - Patient continues to experience anxiety, which she has been attempting to control with lifestyle modification including exercise and cognitive behavioral treatment.  I discussed with patient how to calm down her mood and coping her stress.  I discussed with patient to do yoga, meditation and mindfulness exercise.  Patient is encouraged to do cardio physical exercise 3-4 times per week.  - She declines initiation on medication today, preferring to discuss this with her new PCP at follow up.   - Patient declined to refer to psychologist for counseling.  - Reviewed and filled FMLA letter for patient. Patient is not recommended to work overtime as she may have increase in anxiety and decrease in sleep hours.    2. Primary insomnia  - Patient advised to continue current regimen of over the counter melatonin 2 mg QN PRN. I reviewed potential risks, benefits, and side effects of this medication with the patient in clinic today.  - Discussed sleep hygiene. Go to bed and wake up at consistent times whether work/school day or not. Keep room dark, quiet, and comfortable. Don't nap in late afternoon and don't nap more than an hour. Increase exposure to sunlight during awake times and avoid bright lights before going to sleep. Avoid stimulant or caffeine use more than 4 hours after wake time.     3. Health Maintenance   - Patient has elected to postpone flu vaccine after discussion of potential risks, benefits, and side  effects.    Follow up: Return in about 3 months (around 3/10/2020) for Anxiety, Insomnia.    I, Brynn Manning (Scribe), am scribing for, and in the presence of, Shy Kaye M.D.. Electronically signed by: Brynn Manning (Luisibpamela), 12/10/2019. I, Shy Kaye M.D., personally performed the services described in this documentation, as scribed by Brynn Manning in my presence, and it is both accurate and complete.    Please note that this dictation was created using voice recognition software. I have made every reasonable attempt to correct obvious errors, but I expect that there may have unintended errors in text, spelling, punctuation, or grammar that I did not discover.

## 2020-03-13 ENCOUNTER — OFFICE VISIT (OUTPATIENT)
Dept: MEDICAL GROUP | Age: 45
End: 2020-03-13
Payer: COMMERCIAL

## 2020-03-13 VITALS
OXYGEN SATURATION: 100 % | BODY MASS INDEX: 24.33 KG/M2 | SYSTOLIC BLOOD PRESSURE: 122 MMHG | HEIGHT: 62 IN | HEART RATE: 69 BPM | TEMPERATURE: 97.1 F | WEIGHT: 132.2 LBS | DIASTOLIC BLOOD PRESSURE: 70 MMHG

## 2020-03-13 DIAGNOSIS — M72.2 PLANTAR FASCIITIS: ICD-10-CM

## 2020-03-13 DIAGNOSIS — F51.01 PRIMARY INSOMNIA: ICD-10-CM

## 2020-03-13 DIAGNOSIS — F41.1 GAD (GENERALIZED ANXIETY DISORDER): ICD-10-CM

## 2020-03-13 DIAGNOSIS — Z13.6 SCREENING FOR CARDIOVASCULAR CONDITION: ICD-10-CM

## 2020-03-13 DIAGNOSIS — Z00.00 WELL ADULT EXAM: ICD-10-CM

## 2020-03-13 PROCEDURE — 99214 OFFICE O/P EST MOD 30 MIN: CPT | Performed by: PHYSICIAN ASSISTANT

## 2020-03-13 ASSESSMENT — PATIENT HEALTH QUESTIONNAIRE - PHQ9: CLINICAL INTERPRETATION OF PHQ2 SCORE: 0

## 2020-03-13 ASSESSMENT — FIBROSIS 4 INDEX: FIB4 SCORE: 0.74

## 2020-03-13 NOTE — PROGRESS NOTES
"cc: Establish care and bilateral heel pain    Subjective:     HPI  Venecia Buchanan is a 44 y.o. female presenting to [facility].  She was previously patient of Dr. Kaye's and is transferring care.  She works as a  for [employer].  She is currently followed by gynecology and up-to-date on her Pap.    JANICE (generalized anxiety disorder)  This is a chronic issue.  She does not feel she needs medication.  She has been managing her anxiety through mindfulness and Destressing techniques.  She states that this works fairly well for her.  She feels that her anxiety levels are manageable.  She does have mild insomnia.  She will take melatonin, which helps significantly.  She states she takes melatonin maybe 2 times weekly.  She does practice good sleep hygiene.  Denies SI, HI, depression.    Heel pain  For the past 6 weeks she has had bilateral heel pain, her right is worse than her left.  She states that it is on the bottom portion of her heel.  It is fairly constant, worse with standing, states that it is a 6/10 aching pain.  It does not radiate.  She did get new shoes 3 days ago, and has noted some improvement in her overall pain.  Has not taken anything over-the-counter.  Denies injury, erythema, edema, numbness/tingling.    Review of systems:  See above.       Current Outpatient Medications:   •  vitamin D (CHOLECALCIFEROL) 1000 UNIT Tab, Take 1,000 Units by mouth every day., Disp: , Rfl:     Allergies, past medical history, past surgical history, family history, social history reviewed and updated    Objective:     Vitals: /70 (BP Location: Left arm, Patient Position: Sitting, BP Cuff Size: Adult)   Pulse 69   Temp 36.2 °C (97.1 °F) (Temporal)   Ht 1.575 m (5' 2\")   Wt 60 kg (132 lb 3.2 oz)   LMP 03/12/2020 (Approximate)   SpO2 100%   Breastfeeding No   BMI 24.18 kg/m²   General: Alert, pleasant, NAD  HEENT: Normocephalic. Neck supple.  No thyromegaly or masses palpated. No " cervical or supraclavicular lymphadenopathy. No carotid bruits   Heart: Regular rate and rhythm.  S1 and S2 normal.  No murmurs appreciated.  Respiratory: Normal respiratory effort.  Clear to auscultation bilaterally.  Skin: Warm, dry, no rashes.  Foot: Mild tenderness to palpation along the lateral, plantar surface of the heel, at the insertion of the plantar fascia bilaterally.  No erythema, edema, warmth.  Extremities: No leg edema.  Radial and pedal pulses 2+ symmetric  Psych:  Affect/mood is normal, judgement is good, memory is intact, grooming is appropriate.    Assessment/Plan:     Venecia was seen today for establish care, anxiety, insomnia and pain.    Diagnoses and all orders for this visit:    JANICE (generalized anxiety disorder)  -Stable.  She does not feel she needs medication at this time.  She will continue with distressing techniques and mindfulness.  -     CBC WITH DIFFERENTIAL; Future  -     TSH WITH REFLEX TO FT4; Future    Primary insomnia  -Stable.  Continue with melatonin as needed    Plantar fasciitis  -Discussed exam pains with patient.  Advised proper shoe wear, shoe inserts, massage, ibuprofen..  If she has worsening or no improvement to follow-up and will place referral to podiatry for further evaluation.    Well adult exam  -     CBC WITH DIFFERENTIAL; Future  -     Comp Metabolic Panel; Future  -     Lipid Profile; Future  -     TSH WITH REFLEX TO FT4; Future    Screening for cardiovascular condition  -     Comp Metabolic Panel; Future  -     Lipid Profile; Future        Return in about 4 weeks (around 4/10/2020) for Annual PX.

## 2020-03-13 NOTE — ASSESSMENT & PLAN NOTE
This is a chronic issue.  She does not feel she needs medication.  She has been managing her anxiety through mindfulness and Destressing techniques.  She states that this works fairly well for her.  She feels that her anxiety levels are manageable.  She does have mild insomnia.  She will take melatonin, which helps significantly.  She states she takes melatonin maybe 2 times weekly.  She does practice good sleep hygiene.  Denies SI, HI, depression.

## 2020-04-24 ENCOUNTER — HOSPITAL ENCOUNTER (OUTPATIENT)
Dept: LAB | Facility: MEDICAL CENTER | Age: 45
End: 2020-04-24
Attending: PHYSICIAN ASSISTANT
Payer: COMMERCIAL

## 2020-04-24 DIAGNOSIS — Z00.00 WELL ADULT EXAM: ICD-10-CM

## 2020-04-24 DIAGNOSIS — F41.1 GAD (GENERALIZED ANXIETY DISORDER): ICD-10-CM

## 2020-04-24 DIAGNOSIS — Z13.6 SCREENING FOR CARDIOVASCULAR CONDITION: ICD-10-CM

## 2020-04-24 LAB
ALBUMIN SERPL BCP-MCNC: 4.6 G/DL (ref 3.2–4.9)
ALBUMIN/GLOB SERPL: 1.5 G/DL
ALP SERPL-CCNC: 57 U/L (ref 30–99)
ALT SERPL-CCNC: 12 U/L (ref 2–50)
ANION GAP SERPL CALC-SCNC: 13 MMOL/L (ref 7–16)
AST SERPL-CCNC: 16 U/L (ref 12–45)
BASOPHILS # BLD AUTO: 0.4 % (ref 0–1.8)
BASOPHILS # BLD: 0.02 K/UL (ref 0–0.12)
BILIRUB SERPL-MCNC: 0.7 MG/DL (ref 0.1–1.5)
BUN SERPL-MCNC: 12 MG/DL (ref 8–22)
CALCIUM SERPL-MCNC: 9.5 MG/DL (ref 8.5–10.5)
CHLORIDE SERPL-SCNC: 104 MMOL/L (ref 96–112)
CHOLEST SERPL-MCNC: 183 MG/DL (ref 100–199)
CO2 SERPL-SCNC: 23 MMOL/L (ref 20–33)
CREAT SERPL-MCNC: 0.53 MG/DL (ref 0.5–1.4)
EOSINOPHIL # BLD AUTO: 0.12 K/UL (ref 0–0.51)
EOSINOPHIL NFR BLD: 2.5 % (ref 0–6.9)
ERYTHROCYTE [DISTWIDTH] IN BLOOD BY AUTOMATED COUNT: 42.7 FL (ref 35.9–50)
GLOBULIN SER CALC-MCNC: 3 G/DL (ref 1.9–3.5)
GLUCOSE SERPL-MCNC: 89 MG/DL (ref 65–99)
HCT VFR BLD AUTO: 41.6 % (ref 37–47)
HDLC SERPL-MCNC: 49 MG/DL
HGB BLD-MCNC: 13.6 G/DL (ref 12–16)
IMM GRANULOCYTES # BLD AUTO: 0.01 K/UL (ref 0–0.11)
IMM GRANULOCYTES NFR BLD AUTO: 0.2 % (ref 0–0.9)
LDLC SERPL CALC-MCNC: 118 MG/DL
LYMPHOCYTES # BLD AUTO: 1.36 K/UL (ref 1–4.8)
LYMPHOCYTES NFR BLD: 28.2 % (ref 22–41)
MCH RBC QN AUTO: 28.8 PG (ref 27–33)
MCHC RBC AUTO-ENTMCNC: 32.7 G/DL (ref 33.6–35)
MCV RBC AUTO: 87.9 FL (ref 81.4–97.8)
MONOCYTES # BLD AUTO: 0.33 K/UL (ref 0–0.85)
MONOCYTES NFR BLD AUTO: 6.8 % (ref 0–13.4)
NEUTROPHILS # BLD AUTO: 2.99 K/UL (ref 2–7.15)
NEUTROPHILS NFR BLD: 61.9 % (ref 44–72)
NRBC # BLD AUTO: 0 K/UL
NRBC BLD-RTO: 0 /100 WBC
PLATELET # BLD AUTO: 246 K/UL (ref 164–446)
PMV BLD AUTO: 10 FL (ref 9–12.9)
POTASSIUM SERPL-SCNC: 4.4 MMOL/L (ref 3.6–5.5)
PROT SERPL-MCNC: 7.6 G/DL (ref 6–8.2)
RBC # BLD AUTO: 4.73 M/UL (ref 4.2–5.4)
SODIUM SERPL-SCNC: 140 MMOL/L (ref 135–145)
TRIGL SERPL-MCNC: 81 MG/DL (ref 0–149)
TSH SERPL DL<=0.005 MIU/L-ACNC: 1.47 UIU/ML (ref 0.38–5.33)
WBC # BLD AUTO: 4.8 K/UL (ref 4.8–10.8)

## 2020-04-24 PROCEDURE — 80053 COMPREHEN METABOLIC PANEL: CPT

## 2020-04-24 PROCEDURE — 85025 COMPLETE CBC W/AUTO DIFF WBC: CPT

## 2020-04-24 PROCEDURE — 84443 ASSAY THYROID STIM HORMONE: CPT

## 2020-04-24 PROCEDURE — 36415 COLL VENOUS BLD VENIPUNCTURE: CPT

## 2020-04-24 PROCEDURE — 80061 LIPID PANEL: CPT

## 2020-04-30 ENCOUNTER — OFFICE VISIT (OUTPATIENT)
Dept: MEDICAL GROUP | Age: 45
End: 2020-04-30
Payer: COMMERCIAL

## 2020-04-30 VITALS
OXYGEN SATURATION: 98 % | WEIGHT: 129 LBS | DIASTOLIC BLOOD PRESSURE: 66 MMHG | HEART RATE: 77 BPM | SYSTOLIC BLOOD PRESSURE: 108 MMHG | TEMPERATURE: 97.4 F | BODY MASS INDEX: 23.74 KG/M2 | HEIGHT: 62 IN

## 2020-04-30 DIAGNOSIS — F51.01 PRIMARY INSOMNIA: ICD-10-CM

## 2020-04-30 DIAGNOSIS — Z00.00 WELL ADULT EXAM: ICD-10-CM

## 2020-04-30 DIAGNOSIS — F41.1 GENERALIZED ANXIETY DISORDER: ICD-10-CM

## 2020-04-30 DIAGNOSIS — Z12.31 ENCOUNTER FOR SCREENING MAMMOGRAM FOR BREAST CANCER: ICD-10-CM

## 2020-04-30 PROCEDURE — 99396 PREV VISIT EST AGE 40-64: CPT | Performed by: PHYSICIAN ASSISTANT

## 2020-04-30 ASSESSMENT — FIBROSIS 4 INDEX: FIB4 SCORE: 0.81

## 2020-04-30 NOTE — PROGRESS NOTES
cc: well exam    Subjective:     HPI    Venecia Buchanan is a 44 y.o. female presents for a routine preventive health exam.  She exercises once a week if that.  She states that her diet is fairly well-rounded.  She is up-to-date on Pap.  She is due for her mammogram.  She has no concerns    Results for VENECIA BUCHANAN (MRN [id number]) as of 4/30/2020 11:05   Ref. Range 4/24/2020 11:11   WBC Latest Ref Range: 4.8 - 10.8 K/uL 4.8   RBC Latest Ref Range: 4.20 - 5.40 M/uL 4.73   Hemoglobin Latest Ref Range: 12.0 - 16.0 g/dL 13.6   Hematocrit Latest Ref Range: 37.0 - 47.0 % 41.6   MCV Latest Ref Range: 81.4 - 97.8 fL 87.9   MCH Latest Ref Range: 27.0 - 33.0 pg 28.8   MCHC Latest Ref Range: 33.6 - 35.0 g/dL 32.7 (L)   RDW Latest Ref Range: 35.9 - 50.0 fL 42.7   Platelet Count Latest Ref Range: 164 - 446 K/uL 246   MPV Latest Ref Range: 9.0 - 12.9 fL 10.0   Neutrophils-Polys Latest Ref Range: 44.00 - 72.00 % 61.90   Neutrophils (Absolute) Latest Ref Range: 2.00 - 7.15 K/uL 2.99   Lymphocytes Latest Ref Range: 22.00 - 41.00 % 28.20   Lymphs (Absolute) Latest Ref Range: 1.00 - 4.80 K/uL 1.36   Monocytes Latest Ref Range: 0.00 - 13.40 % 6.80   Monos (Absolute) Latest Ref Range: 0.00 - 0.85 K/uL 0.33   Eosinophils Latest Ref Range: 0.00 - 6.90 % 2.50   Eos (Absolute) Latest Ref Range: 0.00 - 0.51 K/uL 0.12   Basophils Latest Ref Range: 0.00 - 1.80 % 0.40   Baso (Absolute) Latest Ref Range: 0.00 - 0.12 K/uL 0.02   Immature Granulocytes Latest Ref Range: 0.00 - 0.90 % 0.20   Immature Granulocytes (abs) Latest Ref Range: 0.00 - 0.11 K/uL 0.01   Nucleated RBC Latest Units: /100 WBC 0.00   NRBC (Absolute) Latest Units: K/uL 0.00   Sodium Latest Ref Range: 135 - 145 mmol/L 140   Potassium Latest Ref Range: 3.6 - 5.5 mmol/L 4.4   Chloride Latest Ref Range: 96 - 112 mmol/L 104   Co2 Latest Ref Range: 20 - 33 mmol/L 23   Anion Gap Latest Ref Range: 7.0 - 16.0  13.0   Glucose Latest Ref Range: 65 - 99 mg/dL 89   Bun Latest Ref  Range: 8 - 22 mg/dL 12   Creatinine Latest Ref Range: 0.50 - 1.40 mg/dL 0.53   GFR If  Latest Ref Range: >60 mL/min/1.73 m 2 >60   GFR If Non  Latest Ref Range: >60 mL/min/1.73 m 2 >60   Calcium Latest Ref Range: 8.5 - 10.5 mg/dL 9.5   AST(SGOT) Latest Ref Range: 12 - 45 U/L 16   ALT(SGPT) Latest Ref Range: 2 - 50 U/L 12   Alkaline Phosphatase Latest Ref Range: 30 - 99 U/L 57   Total Bilirubin Latest Ref Range: 0.1 - 1.5 mg/dL 0.7   Albumin Latest Ref Range: 3.2 - 4.9 g/dL 4.6   Total Protein Latest Ref Range: 6.0 - 8.2 g/dL 7.6   Globulin Latest Ref Range: 1.9 - 3.5 g/dL 3.0   A-G Ratio Latest Units: g/dL 1.5   Cholesterol,Tot Latest Ref Range: 100 - 199 mg/dL 183   Triglycerides Latest Ref Range: 0 - 149 mg/dL 81   HDL Latest Ref Range: >=40 mg/dL 49   LDL Latest Ref Range: <100 mg/dL 118 (H)   TSH Latest Ref Range: 0.380 - 5.330 uIU/mL 1.470       Review of systems:    Constitutional:  No F/C, night sweats, weight loss, fatigue, or trouble sleeping  Head/Neck: No headache, dizziness, neck pain, or jaylen enlargement  Eyes: No change in vision, flashing lights, pain, redness, discharge  Ears: No pain, tinnitus, discharge, hearing loss  Nose: No discharge, sinus pain, nosebleeds, allergies  Mouth/Throat: No sores or lesions, sore throat, hoarseness, dysphagia  Lungs: No cough, sob, dyspnea, or wheezing  Cardiac: No chest pain, palpitations, syncope, or LE edema  Skin: No color changes, itching, dryness, rashes  Heme: No increased bruising or prolonged bleeding  Endo: No heat or cold intolerance, excessively dry skin, sweating, polydipsia, polyuria, or polyphagia.    GI: No pain, n/v, heartburn, blood in stool, constipation or diarrhea  MSK: No joint pain, stiffness, swelling, heat, redness        Current Outpatient Medications:   •  vitamin D (CHOLECALCIFEROL) 1000 UNIT Tab, Take 1,000 Units by mouth every day., Disp: , Rfl:     No Known Allergies    Past Medical History:   Diagnosis  Date   • Anxiety    • Insomnia      Past Surgical History:   Procedure Laterality Date   • PRIMARY C SECTION  3/24/2011    Performed by CASSANDRA TAMAYO at [facility]     Family History   Problem Relation Age of Onset   • Diabetes Mother    • Hypertension Mother    • Cancer Maternal Grandfather 60        COLON   • Hypertension Father    • Cancer Paternal Aunt 50        breast cancer     Social History     Socioeconomic History   • Marital status:      Spouse name: Not on file   • Number of children: Not on file   • Years of education: Not on file   • Highest education level: Not on file   Occupational History   • Not on file   Social Needs   • Financial resource strain: Not on file   • Food insecurity     Worry: Not on file     Inability: Not on file   • Transportation needs     Medical: Not on file     Non-medical: Not on file   Tobacco Use   • Smoking status: Never Smoker   • Smokeless tobacco: Never Used   Substance and Sexual Activity   • Alcohol use: No     Alcohol/week: 0.0 oz   • Drug use: No   • Sexual activity: Never     Partners: Male     Birth control/protection: Coitus Interruptus   Lifestyle   • Physical activity     Days per week: Not on file     Minutes per session: Not on file   • Stress: Not on file   Relationships   • Social connections     Talks on phone: Not on file     Gets together: Not on file     Attends Jewish service: Not on file     Active member of club or organization: Not on file     Attends meetings of clubs or organizations: Not on file     Relationship status: Not on file   • Intimate partner violence     Fear of current or ex partner: Not on file     Emotionally abused: Not on file     Physically abused: Not on file     Forced sexual activity: Not on file   Other Topics Concern   • Not on file   Social History Narrative   • Not on file       Objective:     Vitals: /66 (BP Location: Left arm, Patient Position: Sitting, BP Cuff Size: Adult)   Pulse 77   Temp 36.3  "°C (97.4 °F) (Temporal)   Ht 1.575 m (5' 2\")   Wt 58.5 kg (129 lb)   LMP 04/29/2020 (Approximate)   SpO2 98%   Breastfeeding No   BMI 23.59 kg/m²   General: Alert, pleasant, NAD  HEENT:  Normocephalic.  PERRL, EOMI, no icterus or pallor.  Conjunctivae and lids normal.  External ears normal. Tympanic membranes pearly, opaque.  Oropharynx non-erythematous, mucous membranes moist.  Neck supple.  No thyromegaly or masses palpated. No cervical or supraclavicular lymphadenopathy. No Carotid bruits.   Heart:  Regular rate and rhythm.  S1 and S2 normal.  No murmurs appreciated.  Respiratory:  Normal respiratory effort.  Clear to auscultation bilaterally.  Abdomen:  Bowel sounds present.  Non-distended, soft, non-tender, no guarding/rebound. No hepatosplenomegaly.  No hernias.  Skin:  Warm, dry, no rashes  Musculoskeletal:  Gait is normal.  Moves all extremities well.  Extremities:  Pedal pulses 2+ symmetric. No leg edema.  Neurological: No tremors, sensation grossly intact, patellar and biceps reflexes 2+ symmetric, tone/strength normal, CN 2-12 intact.  Psych:  Affect/mood is normal, judgement is good, memory is intact, grooming is appropriate.        Assessment/Plan:     Diagnoses and all orders for this visit:    Well adult exam  -Her cholesterol level slightly elevated.  Advised to increase physical activity as tolerated and reviewed diet control.    Primary insomnia  -Controlled.  Continue with melatonin as needed    JANICE (generalized anxiety disorder)  -Stable.  She does not feel she needs medications at this time.  She uses mindfulness and destressing techniques which work well for her.    Encounter for screening mammogram for breast cancer  -     MA-SCREENING MAMMO BILAT W/TOMOSYNTHESIS W/CAD; Future          Patient Counseling:  --Discussed moderation in sodium/caffeine intake, saturated fat and cholesterol, caloric balance, sufficient fresh fruits/vegetables, fiber, iron  --Discussed brushing, flossing, and " dental visits.   --Encouraged regular exercise.   --Discussed tobacco, alcohol, or other drug use; availability of treatment for abuse.   --Discussed sexually transmitted infections, partner selection, use of condoms, avoidance of unintended pregnancy and contraceptive alternatives.  --Discussed the issue of estrogen replacement, calcium supplement, and the daily use of baby aspirin.  --Injury prevention: Discussed safety belts, safety helmets, smoke detector, etc.    Preventive visit in 1 year, sooner as needed for any concerns.

## 2020-05-01 ENCOUNTER — HOSPITAL ENCOUNTER (OUTPATIENT)
Dept: RADIOLOGY | Facility: MEDICAL CENTER | Age: 45
End: 2020-05-01
Payer: COMMERCIAL

## 2020-06-12 ENCOUNTER — HOSPITAL ENCOUNTER (OUTPATIENT)
Dept: RADIOLOGY | Facility: MEDICAL CENTER | Age: 45
End: 2020-06-12
Attending: PHYSICIAN ASSISTANT
Payer: COMMERCIAL

## 2020-06-12 DIAGNOSIS — Z12.31 ENCOUNTER FOR SCREENING MAMMOGRAM FOR BREAST CANCER: ICD-10-CM

## 2020-06-12 PROCEDURE — 77067 SCR MAMMO BI INCL CAD: CPT

## 2020-08-04 ENCOUNTER — NURSE TRIAGE (OUTPATIENT)
Dept: HEALTH INFORMATION MANAGEMENT | Facility: OTHER | Age: 45
End: 2020-08-04

## 2020-08-04 NOTE — TELEPHONE ENCOUNTER
1. Caller Name:Venecia Buchanan                Call Back Number: [phone]  [facility] PCP or Specialty Provider: Yes         2.  In the last two weeks, has the patient had any new or worsening symptoms (not explained by alternative diagnosis)? No.    3.  Does patient have any comoribidities? None     4.  Has the patient traveled in the last 14 days OR had any known contact with someone who is suspected or confirmed to have COVID-19?  No.    5. Disposition: Advised to perform self care, monitor for worsening symptoms and to call back in 3 days if no improvement    Note routed to [facility] Provider: Provider action needed: Patient is requesting Covid nasoswab test order. She is concerned she possibly might have been exposed at work.

## 2020-08-06 ENCOUNTER — TELEPHONE (OUTPATIENT)
Dept: MEDICAL GROUP | Age: 45
End: 2020-08-06

## 2020-08-06 NOTE — TELEPHONE ENCOUNTER
VOICEMAIL  1. Caller Name: Venecia Buchanan    Call Back Number: [phone] (home)       2. Message: Pt left vm stating that she is requesting the COVID-19 nasal swab for her and  possible exposure at work. Please advise.    3. Patient approves office to leave a detailed voicemail/MyChart message: yes

## 2020-08-11 NOTE — TELEPHONE ENCOUNTER
Phone Number Called: [phone] (home)       Call outcome: Left detailed message for patient. Informed to call back with any additional questions.    Message: Left vm informing of covering pcp note. Also informed the option of urgent care or virtual visit with covering provider to order testing.

## 2020-08-13 NOTE — TELEPHONE ENCOUNTER
Phone Number Called: [phone]    Call outcome: Spoke to patient regarding message below.    Message: Informed patient to contact the Health Department

## 2020-12-14 ENCOUNTER — TELEMEDICINE (OUTPATIENT)
Dept: MEDICAL GROUP | Age: 45
End: 2020-12-14
Payer: COMMERCIAL

## 2020-12-14 VITALS — WEIGHT: 132 LBS | BODY MASS INDEX: 24.29 KG/M2 | HEIGHT: 62 IN | TEMPERATURE: 97.7 F

## 2020-12-14 DIAGNOSIS — F51.01 PRIMARY INSOMNIA: ICD-10-CM

## 2020-12-14 DIAGNOSIS — F41.1 GENERALIZED ANXIETY DISORDER: ICD-10-CM

## 2020-12-14 PROBLEM — K21.9 GASTROESOPHAGEAL REFLUX DISEASE: Status: ACTIVE | Noted: 2020-11-30

## 2020-12-14 PROBLEM — E78.5 HYPERLIPIDEMIA: Status: ACTIVE | Noted: 2020-11-30

## 2020-12-14 PROCEDURE — 99212 OFFICE O/P EST SF 10 MIN: CPT | Mod: 95,CR | Performed by: PHYSICIAN ASSISTANT

## 2020-12-14 ASSESSMENT — FIBROSIS 4 INDEX: FIB4 SCORE: 0.83

## 2020-12-14 NOTE — PROGRESS NOTES
Virtual Visit: Established Patient   This visit was conducted via Zoom using secure and encrypted videoconferencing technology. The patient was in a private location in the [facility].    The patient's identity was confirmed and verbal consent was obtained for this virtual visit.    Subjective:   CC:   Chief Complaint   Patient presents with   • Paperwork     FMLA        Venecia Buchanan is a 45 y.o. female presenting for evaluation and management of:    Generalized anxiety    Venecia presents for follow-up generalized anxiety.  She is able to control anxiety through lifestyle modifications with distressing techniques and coping skills.  She has never been on medications for this.  However, she does work for [employer] and the hours are very demanding working 10-14 hour days at times 7 days a week.  This does increase her stress and anxiety, also insomnia as well.  She is requesting [facility] paperwork to be resubmitted for this year, with request work no longer than 8-hour shifts-40-hour weeks.  Denies depression, SI, HI.    ROS   Denies any recent fevers or chills. No nausea or vomiting. No chest pains or shortness of breath.     No Known Allergies    Current medicines (including changes today)  Current Outpatient Medications   Medication Sig Dispense Refill   • vitamin D (CHOLECALCIFEROL) 1000 UNIT Tab Take 1,000 Units by mouth every day.       No current facility-administered medications for this visit.        Patient Active Problem List    Diagnosis Date Noted   • Gastroesophageal reflux disease 11/30/2020   • Hyperlipidemia 11/30/2020   • JANICE (generalized anxiety disorder) 12/20/2018   • Primary insomnia 12/20/2018   • Unspecified lump in left breast, subareolar 08/27/2018       Family History   Problem Relation Age of Onset   • Diabetes Mother    • Hypertension Mother    • Cancer Maternal Grandfather 60        COLON   • Hypertension Father    • Cancer Paternal Aunt 50        breast cancer       She  has a past  "medical history of Anxiety and Insomnia.  She  has a past surgical history that includes primary c section (3/24/2011).       Objective:   Temp 36.5 °C (97.7 °F) (Temporal) Comment: pt. stated  Ht 1.575 m (5' 2\") Comment: pt. stated  Wt 59.9 kg (132 lb) Comment: pt. stated  BMI 24.14 kg/m²     Physical Exam:  Constitutional: Alert, no distress, well-groomed.  Skin: No rashes in visible areas.  Eye: Round. Conjunctiva clear, lids normal. No icterus.   ENMT: Lips pink without lesions, good dentition, moist mucous membranes. Phonation normal.  Neck: No masses, no thyromegaly. Moves freely without pain.  Respiratory: Unlabored respiratory effort, no cough or audible wheeze  Psych: Alert and oriented x3, normal affect and mood.       Assessment and Plan:   The following treatment plan was discussed:     1. JANICE (generalized anxiety disorder)  -She does not feel she needs medications at this time.  Continue with coping skills and de-stressing techniques.  Her increased work hours are increasing her anxiety.  Will fill out and complete [place] paperwork with reduced work schedule to 40 hours a week.    2. Primary insomnia  Continue with melatonin nightly.      Follow-up: Return in about 4 months (around 4/14/2021) for Annual PX.         "

## 2021-01-28 ENCOUNTER — HOSPITAL ENCOUNTER (OUTPATIENT)
Dept: LAB | Facility: MEDICAL CENTER | Age: 46
End: 2021-01-28
Attending: INTERNAL MEDICINE
Payer: COMMERCIAL

## 2021-01-28 LAB
25(OH)D3 SERPL-MCNC: 65 NG/ML (ref 30–100)
CHOLEST SERPL-MCNC: 168 MG/DL (ref 100–199)
HDLC SERPL-MCNC: 52 MG/DL
LDLC SERPL CALC-MCNC: 104 MG/DL
TRIGL SERPL-MCNC: 58 MG/DL (ref 0–149)

## 2021-01-28 PROCEDURE — 80061 LIPID PANEL: CPT

## 2021-01-28 PROCEDURE — 82306 VITAMIN D 25 HYDROXY: CPT

## 2021-01-28 PROCEDURE — 36415 COLL VENOUS BLD VENIPUNCTURE: CPT

## 2021-01-28 PROCEDURE — 87522 HEPATITIS C REVRS TRNSCRPJ: CPT

## 2021-01-31 LAB
HCV RNA SERPL NAA+PROBE-ACNC: NOT DETECTED IU/ML
HCV RNA SERPL NAA+PROBE-LOG IU: NOT DETECTED LOG IU/ML
HCV RNA SERPL QL NAA+PROBE: NOT DETECTED

## 2021-06-01 ENCOUNTER — OFFICE VISIT (OUTPATIENT)
Dept: MEDICAL GROUP | Age: 46
End: 2021-06-01
Payer: COMMERCIAL

## 2021-06-01 VITALS
WEIGHT: 130.2 LBS | HEART RATE: 74 BPM | BODY MASS INDEX: 23.96 KG/M2 | DIASTOLIC BLOOD PRESSURE: 70 MMHG | SYSTOLIC BLOOD PRESSURE: 118 MMHG | HEIGHT: 62 IN | OXYGEN SATURATION: 98 % | TEMPERATURE: 96.9 F

## 2021-06-01 DIAGNOSIS — Z00.00 WELL ADULT EXAM: ICD-10-CM

## 2021-06-01 DIAGNOSIS — E78.2 MODERATE MIXED HYPERLIPIDEMIA NOT REQUIRING STATIN THERAPY: ICD-10-CM

## 2021-06-01 DIAGNOSIS — F51.01 PRIMARY INSOMNIA: ICD-10-CM

## 2021-06-01 DIAGNOSIS — Z12.31 ENCOUNTER FOR SCREENING MAMMOGRAM FOR BREAST CANCER: ICD-10-CM

## 2021-06-01 DIAGNOSIS — F41.1 GENERALIZED ANXIETY DISORDER: ICD-10-CM

## 2021-06-01 DIAGNOSIS — Z23 NEED FOR VACCINATION: ICD-10-CM

## 2021-06-01 PROCEDURE — 99396 PREV VISIT EST AGE 40-64: CPT | Performed by: PHYSICIAN ASSISTANT

## 2021-06-01 ASSESSMENT — PATIENT HEALTH QUESTIONNAIRE - PHQ9: CLINICAL INTERPRETATION OF PHQ2 SCORE: 0

## 2021-06-01 ASSESSMENT — FIBROSIS 4 INDEX: FIB4 SCORE: 0.83

## 2021-06-01 NOTE — PROGRESS NOTES
Subjective:     CC:   Chief Complaint   Patient presents with   • Annual Exam       HPI:   Venecia Buchanan is a 45 y.o. female who presents for annual exam    Patient has GYN provider: Yes  Last Pap Smear: 2018  H/O Abnormal Pap: No  Last Mammogram: 2020  Last Bone Density Test: N/A  Last Colorectal Cancer Screening: N/A  Last Tdap: Due  Received HPV series: Aged out    Exercise: no regular exercise, sedentary  Diet: Good      Patient's last menstrual period was 2021 (approximate).  Hx STDs: No  Birth control: none  Menses every month with 5 days with light, moderate bleeding.  Denies cramping.  No significant bloating/fluid retention, pelvic pain, or dyspareunia. No abnormal vaginal discharge.  No breast tenderness, mass, nipple discharge, changes in size or contour, or abnormal cyclic discomfort.    Results for VENECIA BUCHANAN (MRN [id number]) as of 2021 09:33   Ref. Range 2021 08:03   Cholesterol,Tot Latest Ref Range: 100 - 199 mg/dL 168   Triglycerides Latest Ref Range: 0 - 149 mg/dL 58   HDL Latest Ref Range: >=40 mg/dL 52   LDL Latest Ref Range: <100 mg/dL 104 (H)   25-Hydroxy   Vitamin D 25 Latest Ref Range: 30 - 100 ng/mL 65   Hepatitis C Rna By Pcr, Quanti Latest Units: IU/mL Not Detected   HCV Qnt by NAAT Interp Latest Ref Range: Not Detected  Not Detected   HCV RNA PCR Qnt Log Latest Units: log IU/mL Not Detected       OB History    Para Term  AB Living   2 1 1 0 1 1   SAB TAB Ectopic Molar Multiple Live Births   1 0 0 0 0 1      She  reports never being sexually active.    She  has a past medical history of Anxiety and Insomnia.  She  has a past surgical history that includes primary c section (3/24/2011).    Family History   Problem Relation Age of Onset   • Diabetes Mother    • Hypertension Mother    • Cancer Maternal Grandfather 60        COLON   • Hypertension Father    • Cancer Paternal Aunt 50        breast cancer     Social History     Tobacco Use   •  "Smoking status: Never Smoker   • Smokeless tobacco: Never Used   Vaping Use   • Vaping Use: Never used   Substance Use Topics   • Alcohol use: No     Alcohol/week: 0.0 oz   • Drug use: No       Patient Active Problem List    Diagnosis Date Noted   • Gastroesophageal reflux disease 11/30/2020   • Hyperlipidemia 11/30/2020   • JANICE (generalized anxiety disorder) 12/20/2018   • Primary insomnia 12/20/2018   • Unspecified lump in left breast, subareolar 08/27/2018     Current Outpatient Medications   Medication Sig Dispense Refill   • Melatonin 10 MG Cap Take  by mouth.     • vitamin D (CHOLECALCIFEROL) 1000 UNIT Tab Take 1,000 Units by mouth every day.       No current facility-administered medications for this visit.     No Known Allergies    Review of Systems   Constitutional: Negative for fever, chills and malaise/fatigue.   HENT: Negative for congestion.    Eyes: Negative for pain.   Respiratory: Negative for cough and shortness of breath.    Cardiovascular: Negative for chest pain and leg swelling.   Gastrointestinal: Negative for nausea, vomiting, abdominal pain and diarrhea.   Genitourinary: Negative for dysuria and hematuria.   Skin: Negative for rash.   Neurological: Negative for dizziness, focal weakness and headaches.   Endo/Heme/Allergies: Does not bruise/bleed easily.   Psychiatric/Behavioral: Negative for depression.  The patient is not nervous/anxious.      Objective:   /70 (BP Location: Left arm, Patient Position: Sitting, BP Cuff Size: Adult)   Pulse 74   Temp 36.1 °C (96.9 °F) (Temporal)   Ht 1.575 m (5' 2\")   Wt 59.1 kg (130 lb 3.2 oz)   LMP 05/14/2021 (Approximate)   SpO2 98%   Breastfeeding No   BMI 23.81 kg/m²     Wt Readings from Last 4 Encounters:   06/01/21 59.1 kg (130 lb 3.2 oz)   12/14/20 59.9 kg (132 lb)   04/30/20 58.5 kg (129 lb)   03/13/20 60 kg (132 lb 3.2 oz)       Physical Exam:  Constitutional: Well-developed and well-nourished. Not diaphoretic. No distress.   Skin: Skin " is warm and dry. No rash noted.  Head: Atraumatic without lesions.  Eyes: Conjunctivae and extraocular motions are normal. Pupils are equal, round, and reactive to light. No scleral icterus.   Ears:  External ears unremarkable. Tympanic membranes clear and intact.  Nose: Nares patent. Septum midline. Turbinates without erythema nor edema. No discharge.   Mouth/Throat: Tongue normal. Oropharynx is clear and moist. Posterior pharynx without erythema or exudates.  Neck: Supple, trachea midline. Normal range of motion. No thyromegaly present. No lymphadenopathy--cervical or supraclavicular.  Cardiovascular: Regular rate and rhythm, S1 and S2 without murmur, rubs, or gallops.    Respiratory: Effort normal. Clear to auscultation throughout. No adventitious sounds.   Abdomen: Soft, non tender, and without distention. Active bowel sounds in all four quadrants. No rebound, guarding, masses or HSM.  Extremities: No cyanosis, clubbing, erythema, nor edema. Distal pulses intact and symmetric.   Musculoskeletal: All major joints AROM full in all directions without pain.  Neurological: Alert and oriented x 3. Grossly non-focal. Strength and sensation grossly intact. DTRs 2+/3 and symmetric.   Psychiatric:  Behavior, mood, and affect are appropriate.    Assessment and Plan:     1. Well adult exam  -Advised to increase physical activity as tolerated and reviewed better control of diet.  - TSH WITH REFLEX TO FT4; Future  - Comp Metabolic Panel; Future  - CBC WITH DIFFERENTIAL; Future    2. JANICE (generalized anxiety disorder)  -Stable without medications.  Uses distressing techniques.    3. Primary insomnia  -Stable.  Continue melatonin    4. Moderate mixed hyperlipidemia not requiring statin therapy  -LDL mildly elevated.  Advised to increase physical activity as tolerated and reviewed diet control.    5. Encounter for screening mammogram for breast cancer  - MA-SCREENING MAMMO BILAT W/TOMOSYNTHESIS W/CAD; Future    6. Need for  vaccination  -Due.  We do not have the Tdap vaccine in the clinic today.  She will come back on the MA schedule at a later date to receive the vaccine  - Tdap Vaccine =>8YO IM      Health maintenance:     Labs per orders  Immunizations per orders  Patient counseled about skin care, diet, supplements, and exercise.  Discussed  breast self exam, mammography screening, family planning choices, adequate intake of calcium and vitamin D, diet and exercise     Follow-up: Return in about 1 year (around 6/1/2022) for Annual PX.

## 2021-06-04 ENCOUNTER — NON-PROVIDER VISIT (OUTPATIENT)
Dept: MEDICAL GROUP | Age: 46
End: 2021-06-04
Payer: COMMERCIAL

## 2021-06-04 PROCEDURE — 90471 IMMUNIZATION ADMIN: CPT | Performed by: PHYSICIAN ASSISTANT

## 2021-06-04 PROCEDURE — 90715 TDAP VACCINE 7 YRS/> IM: CPT | Performed by: PHYSICIAN ASSISTANT

## 2021-06-04 NOTE — PROGRESS NOTES
"Venecia Buchanan is a 45 y.o. female here for a non-provider visit for:   TDAP    Reason for immunization: Overdue/Provider Recommended  Immunization records indicate need for vaccine: Yes, confirmed with Epic  Minimum interval has been met for this vaccine: Yes  ABN completed: Not Indicated    VIS Dated   was given to patient: Yes  All IAC Questionnaire questions were answered \"No.\"    Patient tolerated injection and no adverse effects were observed or reported: Yes    Pt scheduled for next dose in series: No  "

## 2021-06-10 ENCOUNTER — HOSPITAL ENCOUNTER (OUTPATIENT)
Dept: LAB | Facility: MEDICAL CENTER | Age: 46
End: 2021-06-10
Attending: PHYSICIAN ASSISTANT
Payer: COMMERCIAL

## 2021-06-10 DIAGNOSIS — Z00.00 WELL ADULT EXAM: ICD-10-CM

## 2021-06-10 LAB
ALBUMIN SERPL BCP-MCNC: 4.2 G/DL (ref 3.2–4.9)
ALBUMIN/GLOB SERPL: 1.3 G/DL
ALP SERPL-CCNC: 65 U/L (ref 30–99)
ALT SERPL-CCNC: 12 U/L (ref 2–50)
ANION GAP SERPL CALC-SCNC: 10 MMOL/L (ref 7–16)
AST SERPL-CCNC: 15 U/L (ref 12–45)
BASOPHILS # BLD AUTO: 0.9 % (ref 0–1.8)
BASOPHILS # BLD: 0.05 K/UL (ref 0–0.12)
BILIRUB SERPL-MCNC: 0.4 MG/DL (ref 0.1–1.5)
BUN SERPL-MCNC: 13 MG/DL (ref 8–22)
CALCIUM SERPL-MCNC: 8.9 MG/DL (ref 8.5–10.5)
CHLORIDE SERPL-SCNC: 102 MMOL/L (ref 96–112)
CO2 SERPL-SCNC: 24 MMOL/L (ref 20–33)
CREAT SERPL-MCNC: 0.52 MG/DL (ref 0.5–1.4)
EOSINOPHIL # BLD AUTO: 0.13 K/UL (ref 0–0.51)
EOSINOPHIL NFR BLD: 2.6 % (ref 0–6.9)
ERYTHROCYTE [DISTWIDTH] IN BLOOD BY AUTOMATED COUNT: 42.1 FL (ref 35.9–50)
GLOBULIN SER CALC-MCNC: 3.2 G/DL (ref 1.9–3.5)
GLUCOSE SERPL-MCNC: 82 MG/DL (ref 65–99)
HCT VFR BLD AUTO: 41.3 % (ref 37–47)
HGB BLD-MCNC: 13.2 G/DL (ref 12–16)
LYMPHOCYTES # BLD AUTO: 1.09 K/UL (ref 1–4.8)
LYMPHOCYTES NFR BLD: 21.7 % (ref 22–41)
MANUAL DIFF BLD: NORMAL
MCH RBC QN AUTO: 28.6 PG (ref 27–33)
MCHC RBC AUTO-ENTMCNC: 32 G/DL (ref 33.6–35)
MCV RBC AUTO: 89.4 FL (ref 81.4–97.8)
MONOCYTES # BLD AUTO: 0.31 K/UL (ref 0–0.85)
MONOCYTES NFR BLD AUTO: 6.1 % (ref 0–13.4)
MORPHOLOGY BLD-IMP: NORMAL
NEUTROPHILS # BLD AUTO: 3.44 K/UL (ref 2–7.15)
NEUTROPHILS NFR BLD: 68.7 % (ref 44–72)
NRBC # BLD AUTO: 0 K/UL
NRBC BLD-RTO: 0 /100 WBC
PLATELET # BLD AUTO: 258 K/UL (ref 164–446)
PLATELET BLD QL SMEAR: NORMAL
PMV BLD AUTO: 11.5 FL (ref 9–12.9)
POTASSIUM SERPL-SCNC: 3.9 MMOL/L (ref 3.6–5.5)
PROT SERPL-MCNC: 7.4 G/DL (ref 6–8.2)
RBC # BLD AUTO: 4.62 M/UL (ref 4.2–5.4)
RBC BLD AUTO: NORMAL
SODIUM SERPL-SCNC: 136 MMOL/L (ref 135–145)
TSH SERPL DL<=0.005 MIU/L-ACNC: 1.47 UIU/ML (ref 0.38–5.33)
WBC # BLD AUTO: 5 K/UL (ref 4.8–10.8)

## 2021-06-10 PROCEDURE — 36415 COLL VENOUS BLD VENIPUNCTURE: CPT

## 2021-06-10 PROCEDURE — 85027 COMPLETE CBC AUTOMATED: CPT

## 2021-06-10 PROCEDURE — 84443 ASSAY THYROID STIM HORMONE: CPT

## 2021-06-10 PROCEDURE — 80053 COMPREHEN METABOLIC PANEL: CPT

## 2021-06-10 PROCEDURE — 85007 BL SMEAR W/DIFF WBC COUNT: CPT

## 2021-06-15 ENCOUNTER — HOSPITAL ENCOUNTER (OUTPATIENT)
Dept: RADIOLOGY | Facility: MEDICAL CENTER | Age: 46
End: 2021-06-15
Attending: PHYSICIAN ASSISTANT
Payer: COMMERCIAL

## 2021-06-15 DIAGNOSIS — Z12.31 ENCOUNTER FOR SCREENING MAMMOGRAM FOR BREAST CANCER: ICD-10-CM

## 2021-06-15 PROCEDURE — 77063 BREAST TOMOSYNTHESIS BI: CPT

## 2021-12-14 ENCOUNTER — OFFICE VISIT (OUTPATIENT)
Dept: MEDICAL GROUP | Age: 46
End: 2021-12-14
Payer: COMMERCIAL

## 2021-12-14 VITALS
TEMPERATURE: 96.7 F | WEIGHT: 131.6 LBS | BODY MASS INDEX: 24.22 KG/M2 | OXYGEN SATURATION: 97 % | HEIGHT: 62 IN | HEART RATE: 87 BPM | DIASTOLIC BLOOD PRESSURE: 78 MMHG | SYSTOLIC BLOOD PRESSURE: 116 MMHG

## 2021-12-14 DIAGNOSIS — F41.1 GENERALIZED ANXIETY DISORDER: ICD-10-CM

## 2021-12-14 DIAGNOSIS — Z00.00 BLOOD TESTS FOR ROUTINE GENERAL PHYSICAL EXAMINATION: ICD-10-CM

## 2021-12-14 DIAGNOSIS — E78.2 MODERATE MIXED HYPERLIPIDEMIA NOT REQUIRING STATIN THERAPY: ICD-10-CM

## 2021-12-14 PROCEDURE — 99213 OFFICE O/P EST LOW 20 MIN: CPT | Performed by: PHYSICIAN ASSISTANT

## 2021-12-14 ASSESSMENT — ANXIETY QUESTIONNAIRES
GAD7 TOTAL SCORE: 11
4. TROUBLE RELAXING: SEVERAL DAYS
2. NOT BEING ABLE TO STOP OR CONTROL WORRYING: MORE THAN HALF THE DAYS
7. FEELING AFRAID AS IF SOMETHING AWFUL MIGHT HAPPEN: MORE THAN HALF THE DAYS
1. FEELING NERVOUS, ANXIOUS, OR ON EDGE: MORE THAN HALF THE DAYS
3. WORRYING TOO MUCH ABOUT DIFFERENT THINGS: MORE THAN HALF THE DAYS
5. BEING SO RESTLESS THAT IT IS HARD TO SIT STILL: SEVERAL DAYS
6. BECOMING EASILY ANNOYED OR IRRITABLE: SEVERAL DAYS

## 2021-12-14 ASSESSMENT — FIBROSIS 4 INDEX: FIB4 SCORE: 0.76

## 2021-12-14 NOTE — PROGRESS NOTES
"cc:  [place] paperwork    Subjective:     HPI    Venecia Buchanan is a 46 y.o. female presenting for [facility] paperwork to be resubmitted for this year.    She has anxiety that is controlled through lifestyle modifications with distressing techniques and coping skills.  She has never been on medications for this.  However, she does work for [employer] and the hours are very demanding working 10-14 hour days at times 7 days a week.  This does increase her stress and anxiety, also insomnia as well.  She is requesting to work no longer than 8-hour shifts-40-hour weeks.  Denies depression, SI, HI.      Review of systems:  See above.       Current Outpatient Medications:   •  Melatonin 10 MG Cap, Take  by mouth., Disp: , Rfl:   •  vitamin D (CHOLECALCIFEROL) 1000 UNIT Tab, Take 1,000 Units by mouth every day., Disp: , Rfl:     Allergies, past medical history, past surgical history, family history, social history reviewed and updated    Objective:     Vitals: /78 (BP Location: Left arm, Patient Position: Sitting, BP Cuff Size: Adult)   Pulse 87   Temp 35.9 °C (96.7 °F) (Temporal)   Ht 1.575 m (5' 2\")   Wt 59.7 kg (131 lb 9.6 oz)   LMP 12/05/2021 (Approximate)   SpO2 97%   Breastfeeding No   BMI 24.07 kg/m²   General: Alert, pleasant, NAD  HEENT: No carotid bruits   Heart: Regular rate and rhythm.  S1 and S2 normal.  No murmurs appreciated.  Respiratory: Normal respiratory effort.  Clear to auscultation bilaterally.  Skin: Warm, dry, no rashes.  Psych:  Affect/mood is normal, judgement is good, memory is intact, grooming is appropriate.    Assessment/Plan:     Venecia was seen today for paperwork and anxiety.    Diagnoses and all orders for this visit:    JANICE (generalized anxiety disorder)  -Stable without medications.  As long as she does not exceed excessive amount of work hours anxiety is fairly well controlled.  [facility] paperwork filled out and completed stating that she cannot work more than 40 hours/week and no " longer than 8-hour shifts.  -     TSH WITH REFLEX TO FT4; Future    Blood tests for routine general physical examination  -     CBC WITH DIFFERENTIAL; Future  -     Comp Metabolic Panel; Future  -     Lipid Profile; Future  -     TSH WITH REFLEX TO FT4; Future    Moderate mixed hyperlipidemia not requiring statin therapy  -     Comp Metabolic Panel; Future  -     Lipid Profile; Future        Return in about 6 months (around 6/14/2022) for Annual PX, Lab Review.

## 2022-06-23 ENCOUNTER — HOSPITAL ENCOUNTER (OUTPATIENT)
Dept: LAB | Facility: MEDICAL CENTER | Age: 47
End: 2022-06-23
Attending: PHYSICIAN ASSISTANT
Payer: COMMERCIAL

## 2022-06-23 DIAGNOSIS — E78.2 MODERATE MIXED HYPERLIPIDEMIA NOT REQUIRING STATIN THERAPY: ICD-10-CM

## 2022-06-23 DIAGNOSIS — F41.1 GENERALIZED ANXIETY DISORDER: ICD-10-CM

## 2022-06-23 DIAGNOSIS — Z00.00 BLOOD TESTS FOR ROUTINE GENERAL PHYSICAL EXAMINATION: ICD-10-CM

## 2022-06-23 LAB
ALBUMIN SERPL BCP-MCNC: 4.4 G/DL (ref 3.2–4.9)
ALBUMIN/GLOB SERPL: 1.4 G/DL
ALP SERPL-CCNC: 63 U/L (ref 30–99)
ALT SERPL-CCNC: 14 U/L (ref 2–50)
ANION GAP SERPL CALC-SCNC: 10 MMOL/L (ref 7–16)
AST SERPL-CCNC: 16 U/L (ref 12–45)
BASOPHILS # BLD AUTO: 0.7 % (ref 0–1.8)
BASOPHILS # BLD: 0.03 K/UL (ref 0–0.12)
BILIRUB SERPL-MCNC: 0.6 MG/DL (ref 0.1–1.5)
BUN SERPL-MCNC: 10 MG/DL (ref 8–22)
CALCIUM SERPL-MCNC: 9.3 MG/DL (ref 8.5–10.5)
CHLORIDE SERPL-SCNC: 107 MMOL/L (ref 96–112)
CHOLEST SERPL-MCNC: 186 MG/DL (ref 100–199)
CO2 SERPL-SCNC: 23 MMOL/L (ref 20–33)
CREAT SERPL-MCNC: 0.51 MG/DL (ref 0.5–1.4)
EOSINOPHIL # BLD AUTO: 0.12 K/UL (ref 0–0.51)
EOSINOPHIL NFR BLD: 2.9 % (ref 0–6.9)
ERYTHROCYTE [DISTWIDTH] IN BLOOD BY AUTOMATED COUNT: 44.2 FL (ref 35.9–50)
FASTING STATUS PATIENT QL REPORTED: NORMAL
GFR SERPLBLD CREATININE-BSD FMLA CKD-EPI: 117 ML/MIN/1.73 M 2
GLOBULIN SER CALC-MCNC: 3.1 G/DL (ref 1.9–3.5)
GLUCOSE SERPL-MCNC: 85 MG/DL (ref 65–99)
HCT VFR BLD AUTO: 39.6 % (ref 37–47)
HDLC SERPL-MCNC: 59 MG/DL
HGB BLD-MCNC: 12.4 G/DL (ref 12–16)
IMM GRANULOCYTES # BLD AUTO: 0.01 K/UL (ref 0–0.11)
IMM GRANULOCYTES NFR BLD AUTO: 0.2 % (ref 0–0.9)
LDLC SERPL CALC-MCNC: 116 MG/DL
LYMPHOCYTES # BLD AUTO: 1.51 K/UL (ref 1–4.8)
LYMPHOCYTES NFR BLD: 36.3 % (ref 22–41)
MCH RBC QN AUTO: 27.9 PG (ref 27–33)
MCHC RBC AUTO-ENTMCNC: 31.3 G/DL (ref 33.6–35)
MCV RBC AUTO: 89.2 FL (ref 81.4–97.8)
MONOCYTES # BLD AUTO: 0.33 K/UL (ref 0–0.85)
MONOCYTES NFR BLD AUTO: 7.9 % (ref 0–13.4)
NEUTROPHILS # BLD AUTO: 2.16 K/UL (ref 2–7.15)
NEUTROPHILS NFR BLD: 52 % (ref 44–72)
NRBC # BLD AUTO: 0 K/UL
NRBC BLD-RTO: 0 /100 WBC
PLATELET # BLD AUTO: 326 K/UL (ref 164–446)
PMV BLD AUTO: 12.2 FL (ref 9–12.9)
POTASSIUM SERPL-SCNC: 4.6 MMOL/L (ref 3.6–5.5)
PROT SERPL-MCNC: 7.5 G/DL (ref 6–8.2)
RBC # BLD AUTO: 4.44 M/UL (ref 4.2–5.4)
SODIUM SERPL-SCNC: 140 MMOL/L (ref 135–145)
TRIGL SERPL-MCNC: 54 MG/DL (ref 0–149)
TSH SERPL DL<=0.005 MIU/L-ACNC: 1.25 UIU/ML (ref 0.38–5.33)
WBC # BLD AUTO: 4.2 K/UL (ref 4.8–10.8)

## 2022-06-23 PROCEDURE — 80053 COMPREHEN METABOLIC PANEL: CPT

## 2022-06-23 PROCEDURE — 85025 COMPLETE CBC W/AUTO DIFF WBC: CPT

## 2022-06-23 PROCEDURE — 84443 ASSAY THYROID STIM HORMONE: CPT

## 2022-06-23 PROCEDURE — 36415 COLL VENOUS BLD VENIPUNCTURE: CPT

## 2022-06-23 PROCEDURE — 80061 LIPID PANEL: CPT

## 2022-06-28 ENCOUNTER — OFFICE VISIT (OUTPATIENT)
Dept: MEDICAL GROUP | Age: 47
End: 2022-06-28
Payer: COMMERCIAL

## 2022-06-28 VITALS
SYSTOLIC BLOOD PRESSURE: 102 MMHG | HEIGHT: 62 IN | OXYGEN SATURATION: 97 % | HEART RATE: 71 BPM | DIASTOLIC BLOOD PRESSURE: 72 MMHG | BODY MASS INDEX: 22.82 KG/M2 | RESPIRATION RATE: 16 BRPM | TEMPERATURE: 98.3 F | WEIGHT: 124 LBS

## 2022-06-28 DIAGNOSIS — E78.2 MIXED HYPERLIPIDEMIA: ICD-10-CM

## 2022-06-28 DIAGNOSIS — Z00.00 WELL ADULT EXAM: ICD-10-CM

## 2022-06-28 DIAGNOSIS — Z12.31 ENCOUNTER FOR SCREENING MAMMOGRAM FOR BREAST CANCER: ICD-10-CM

## 2022-06-28 PROCEDURE — 99396 PREV VISIT EST AGE 40-64: CPT | Performed by: PHYSICIAN ASSISTANT

## 2022-06-28 SDOH — HEALTH STABILITY: PHYSICAL HEALTH: ON AVERAGE, HOW MANY MINUTES DO YOU ENGAGE IN EXERCISE AT THIS LEVEL?: 30 MIN

## 2022-06-28 SDOH — ECONOMIC STABILITY: TRANSPORTATION INSECURITY
IN THE PAST 12 MONTHS, HAS THE LACK OF TRANSPORTATION KEPT YOU FROM MEDICAL APPOINTMENTS OR FROM GETTING MEDICATIONS?: NO

## 2022-06-28 SDOH — ECONOMIC STABILITY: FOOD INSECURITY: WITHIN THE PAST 12 MONTHS, THE FOOD YOU BOUGHT JUST DIDN'T LAST AND YOU DIDN'T HAVE MONEY TO GET MORE.: NEVER TRUE

## 2022-06-28 SDOH — ECONOMIC STABILITY: HOUSING INSECURITY

## 2022-06-28 SDOH — ECONOMIC STABILITY: HOUSING INSECURITY
IN THE LAST 12 MONTHS, WAS THERE A TIME WHEN YOU DID NOT HAVE A STEADY PLACE TO SLEEP OR SLEPT IN A SHELTER (INCLUDING NOW)?: NO

## 2022-06-28 SDOH — ECONOMIC STABILITY: INCOME INSECURITY: HOW HARD IS IT FOR YOU TO PAY FOR THE VERY BASICS LIKE FOOD, HOUSING, MEDICAL CARE, AND HEATING?: NOT VERY HARD

## 2022-06-28 SDOH — ECONOMIC STABILITY: TRANSPORTATION INSECURITY
IN THE PAST 12 MONTHS, HAS LACK OF RELIABLE TRANSPORTATION KEPT YOU FROM MEDICAL APPOINTMENTS, MEETINGS, WORK OR FROM GETTING THINGS NEEDED FOR DAILY LIVING?: NO

## 2022-06-28 SDOH — ECONOMIC STABILITY: FOOD INSECURITY: WITHIN THE PAST 12 MONTHS, YOU WORRIED THAT YOUR FOOD WOULD RUN OUT BEFORE YOU GOT MONEY TO BUY MORE.: NEVER TRUE

## 2022-06-28 SDOH — HEALTH STABILITY: PHYSICAL HEALTH: ON AVERAGE, HOW MANY DAYS PER WEEK DO YOU ENGAGE IN MODERATE TO STRENUOUS EXERCISE (LIKE A BRISK WALK)?: 2 DAYS

## 2022-06-28 SDOH — HEALTH STABILITY: MENTAL HEALTH
STRESS IS WHEN SOMEONE FEELS TENSE, NERVOUS, ANXIOUS, OR CAN'T SLEEP AT NIGHT BECAUSE THEIR MIND IS TROUBLED. HOW STRESSED ARE YOU?: ONLY A LITTLE

## 2022-06-28 SDOH — ECONOMIC STABILITY: INCOME INSECURITY: IN THE LAST 12 MONTHS, WAS THERE A TIME WHEN YOU WERE NOT ABLE TO PAY THE MORTGAGE OR RENT ON TIME?: NO

## 2022-06-28 SDOH — ECONOMIC STABILITY: TRANSPORTATION INSECURITY
IN THE PAST 12 MONTHS, HAS LACK OF TRANSPORTATION KEPT YOU FROM MEETINGS, WORK, OR FROM GETTING THINGS NEEDED FOR DAILY LIVING?: NO

## 2022-06-28 ASSESSMENT — SOCIAL DETERMINANTS OF HEALTH (SDOH)
DO YOU BELONG TO ANY CLUBS OR ORGANIZATIONS SUCH AS CHURCH GROUPS UNIONS, FRATERNAL OR ATHLETIC GROUPS, OR SCHOOL GROUPS?: NO
HOW HARD IS IT FOR YOU TO PAY FOR THE VERY BASICS LIKE FOOD, HOUSING, MEDICAL CARE, AND HEATING?: NOT VERY HARD
DO YOU BELONG TO ANY CLUBS OR ORGANIZATIONS SUCH AS CHURCH GROUPS UNIONS, FRATERNAL OR ATHLETIC GROUPS, OR SCHOOL GROUPS?: NO
HOW OFTEN DO YOU GET TOGETHER WITH FRIENDS OR RELATIVES?: ONCE A WEEK
HOW OFTEN DO YOU GET TOGETHER WITH FRIENDS OR RELATIVES?: ONCE A WEEK
HOW OFTEN DO YOU HAVE A DRINK CONTAINING ALCOHOL: NEVER
IN A TYPICAL WEEK, HOW MANY TIMES DO YOU TALK ON THE PHONE WITH FAMILY, FRIENDS, OR NEIGHBORS?: THREE TIMES A WEEK
WITHIN THE PAST 12 MONTHS, YOU WORRIED THAT YOUR FOOD WOULD RUN OUT BEFORE YOU GOT THE MONEY TO BUY MORE: NEVER TRUE
IN A TYPICAL WEEK, HOW MANY TIMES DO YOU TALK ON THE PHONE WITH FAMILY, FRIENDS, OR NEIGHBORS?: THREE TIMES A WEEK
HOW OFTEN DO YOU HAVE SIX OR MORE DRINKS ON ONE OCCASION: NEVER
HOW MANY DRINKS CONTAINING ALCOHOL DO YOU HAVE ON A TYPICAL DAY WHEN YOU ARE DRINKING: PATIENT DOES NOT DRINK

## 2022-06-28 ASSESSMENT — FIBROSIS 4 INDEX: FIB4 SCORE: 0.59

## 2022-06-28 ASSESSMENT — PATIENT HEALTH QUESTIONNAIRE - PHQ9: CLINICAL INTERPRETATION OF PHQ2 SCORE: 0

## 2022-06-28 ASSESSMENT — LIFESTYLE VARIABLES
HOW OFTEN DO YOU HAVE SIX OR MORE DRINKS ON ONE OCCASION: NEVER
HOW MANY STANDARD DRINKS CONTAINING ALCOHOL DO YOU HAVE ON A TYPICAL DAY: PATIENT DOES NOT DRINK
SKIP TO QUESTIONS 9-10: 1
AUDIT-C TOTAL SCORE: 0
HOW OFTEN DO YOU HAVE A DRINK CONTAINING ALCOHOL: NEVER

## 2022-06-28 NOTE — PROGRESS NOTES
Subjective:     CC:   Chief Complaint   Patient presents with   • Annual Exam   • Lab Results       HPI:   Venecia Buchanan is a 46 y.o. female who presents for annual exam    Patient has GYN provider: Yes  Last Pap Smear: 2018-has appointment with her gynecologist in 2 months  H/O Abnormal Pap: No  Last Mammogram: 6/2021  Last Tdap: 2021    Exercise: sporadic irregular exercise, <half hour walking weekly  Diet: Fair-room for improvement    Patient's last menstrual period was 06/10/2022.  Hx STDs: No  Birth control: none  Menses every month with 4 days with light, moderate bleeding.  Denies cramping.  No significant bloating/fluid retention, pelvic pain, or dyspareunia. No abnormal vaginal discharge.  No breast tenderness, mass, nipple discharge, changes in size or contour, or abnormal cyclic discomfort.     Latest Reference Range & Units 06/23/22 10:29   WBC 4.8 - 10.8 K/uL 4.2 (L)   RBC 4.20 - 5.40 M/uL 4.44   Hemoglobin 12.0 - 16.0 g/dL 12.4   Hematocrit 37.0 - 47.0 % 39.6   MCV 81.4 - 97.8 fL 89.2   MCH 27.0 - 33.0 pg 27.9   MCHC 33.6 - 35.0 g/dL 31.3 (L)   RDW 35.9 - 50.0 fL 44.2   Platelet Count 164 - 446 K/uL 326   MPV 9.0 - 12.9 fL 12.2   Neutrophils-Polys 44.00 - 72.00 % 52.00   Neutrophils (Absolute) 2.00 - 7.15 K/uL 2.16   Lymphocytes 22.00 - 41.00 % 36.30   Lymphs (Absolute) 1.00 - 4.80 K/uL 1.51   Monocytes 0.00 - 13.40 % 7.90   Monos (Absolute) 0.00 - 0.85 K/uL 0.33   Eosinophils 0.00 - 6.90 % 2.90   Eos (Absolute) 0.00 - 0.51 K/uL 0.12   Basophils 0.00 - 1.80 % 0.70   Baso (Absolute) 0.00 - 0.12 K/uL 0.03   Immature Granulocytes 0.00 - 0.90 % 0.20   Immature Granulocytes (abs) 0.00 - 0.11 K/uL 0.01   Nucleated RBC /100 WBC 0.00   NRBC (Absolute) K/uL 0.00   Sodium 135 - 145 mmol/L 140   Potassium 3.6 - 5.5 mmol/L 4.6   Chloride 96 - 112 mmol/L 107   Co2 20 - 33 mmol/L 23   Anion Gap 7.0 - 16.0  10.0   Glucose 65 - 99 mg/dL 85   Bun 8 - 22 mg/dL 10   Creatinine 0.50 - 1.40 mg/dL 0.51   GFR  (CKD-EPI) >60 mL/min/1.73 m 2 117   Calcium 8.5 - 10.5 mg/dL 9.3   AST(SGOT) 12 - 45 U/L 16   ALT(SGPT) 2 - 50 U/L 14   Alkaline Phosphatase 30 - 99 U/L 63   Total Bilirubin 0.1 - 1.5 mg/dL 0.6   Albumin 3.2 - 4.9 g/dL 4.4   Total Protein 6.0 - 8.2 g/dL 7.5   Globulin 1.9 - 3.5 g/dL 3.1   A-G Ratio g/dL 1.4   Fasting Status  Fasting   Cholesterol,Tot 100 - 199 mg/dL 186   Triglycerides 0 - 149 mg/dL 54   HDL >=40 mg/dL 59   LDL <100 mg/dL 116 (H)   TSH 0.380 - 5.330 uIU/mL 1.250   (L): Data is abnormally low  (H): Data is abnormally high    OB History    Para Term  AB Living   2 1 1 0 1 1   SAB IAB Ectopic Molar Multiple Live Births   1 0 0 0 0 1      She  reports never being sexually active.    She  has a past medical history of Anxiety and Insomnia.  She  has a past surgical history that includes primary c section (3/24/2011).    Family History   Problem Relation Age of Onset   • Diabetes Mother    • Hypertension Mother    • Cancer Maternal Grandfather 60        COLON   • Hypertension Father    • Cancer Paternal Aunt 50        breast cancer     Social History     Tobacco Use   • Smoking status: Never Smoker   • Smokeless tobacco: Never Used   Vaping Use   • Vaping Use: Never used   Substance Use Topics   • Alcohol use: No     Alcohol/week: 0.0 oz   • Drug use: No       Patient Active Problem List    Diagnosis Date Noted   • Gastroesophageal reflux disease 2020   • Hyperlipidemia 2020   • JANICE (generalized anxiety disorder) 2018   • Primary insomnia 2018   • Unspecified lump in left breast, subareolar 2018     Current Outpatient Medications   Medication Sig Dispense Refill   • Melatonin 10 MG Cap Take  by mouth.     • vitamin D (CHOLECALCIFEROL) 1000 UNIT Tab Take 1,000 Units by mouth every day.       No current facility-administered medications for this visit.     No Known Allergies    Review of Systems   Constitutional: Negative for fever, chills and malaise/fatigue.  "  HENT: Negative for congestion.    Eyes: Negative for pain.   Respiratory: Negative for cough and shortness of breath.    Cardiovascular: Negative for chest pain and leg swelling.   Gastrointestinal: Negative for nausea, vomiting, abdominal pain and diarrhea.   Genitourinary: Negative for dysuria and hematuria.   Skin: Negative for rash.   Neurological: Negative for dizziness, focal weakness and headaches.   Endo/Heme/Allergies: Does not bruise/bleed easily.   Psychiatric/Behavioral: Negative for depression.  The patient is not nervous/anxious.      Objective:   /72 (BP Location: Left arm, Patient Position: Sitting, BP Cuff Size: Adult)   Pulse 71   Temp 36.8 °C (98.3 °F) (Temporal)   Resp 16   Ht 1.575 m (5' 2\")   Wt 56.2 kg (124 lb)   LMP 06/10/2022   SpO2 97%   BMI 22.68 kg/m²     Wt Readings from Last 4 Encounters:   06/28/22 56.2 kg (124 lb)   12/14/21 59.7 kg (131 lb 9.6 oz)   06/01/21 59.1 kg (130 lb 3.2 oz)   12/14/20 59.9 kg (132 lb)       Physical Exam:  Constitutional: Well-developed and well-nourished. Not diaphoretic. No distress.   Skin: Skin is warm and dry. No rash noted.  Head: Atraumatic without lesions.  Eyes: Conjunctivae and extraocular motions are normal. Pupils are equal, round, and reactive to light. No scleral icterus.   Ears:  External ears unremarkable. Tympanic membranes clear and intact.  Nose: Nares patent. Septum midline. Turbinates without erythema nor edema. No discharge.   Mouth/Throat: Tongue normal. Oropharynx is clear and moist. Posterior pharynx without erythema or exudates.  Neck: Supple, trachea midline. Normal range of motion. No thyromegaly present. No lymphadenopathy--cervical or supraclavicular.  Cardiovascular: Regular rate and rhythm, S1 and S2 without murmur, rubs, or gallops.    Respiratory: Effort normal. Clear to auscultation throughout. No adventitious sounds.   Abdomen: Soft, non tender, and without distention. Active bowel sounds in all four " quadrants. No rebound, guarding, masses or HSM.  Extremities: No cyanosis, clubbing, erythema, nor edema. Distal pulses intact and symmetric.   Musculoskeletal: All major joints AROM full in all directions without pain.  Neurological: Alert and oriented x 3. Grossly non-focal. Strength and sensation grossly intact. DTRs 2+/3 and symmetric.   Psychiatric:  Behavior, mood, and affect are appropriate.    Assessment and Plan:     1. Well adult exam  Advised increase physical activity as tolerated and reviewed diet control    2. Mixed hyperlipidemia  -Mildly elevated.  Reviewed lifestyle modifications    3. Encounter for screening mammogram for breast cancer  -Due for mammogram.  Order placed  - MA-SCREENING MAMMO BILAT W/TOMOSYNTHESIS W/CAD; Future      Health maintenance:     Labs per orders  Immunizations per orders  Patient counseled about skin care, diet, supplements, and exercise.  Discussed  breast self exam, mammography screening, family planning choices, diet and exercise     Follow-up: Return in about 1 year (around 6/28/2023) for Annual PX.

## 2022-07-12 ENCOUNTER — HOSPITAL ENCOUNTER (OUTPATIENT)
Dept: RADIOLOGY | Facility: MEDICAL CENTER | Age: 47
End: 2022-07-12
Attending: PHYSICIAN ASSISTANT
Payer: COMMERCIAL

## 2022-07-12 DIAGNOSIS — Z12.31 ENCOUNTER FOR SCREENING MAMMOGRAM FOR BREAST CANCER: ICD-10-CM

## 2022-07-12 PROCEDURE — 77063 BREAST TOMOSYNTHESIS BI: CPT

## 2022-08-03 NOTE — PROGRESS NOTES
I tried to call patient, but she did not answer the phone.  I left voice message for her to call back if she has question.  The result was discussed with patient via My Chart as well.    Please call to inform patient that recent left breast ultrasound report was benign.  Radiologist recommended to recheck ultrasound left breast in 6 months for follow-up.  I ordered left breast ultrasound today and she can  the order at [address].  It is advised to do ultrasound left breast in 6 months which will be due in February 2018.    Shy Kaye M.D.   Tazorac Counseling:  Patient advised that medication is irritating and drying.  Patient may need to apply sparingly and wash off after an hour before eventually leaving it on overnight.  The patient verbalized understanding of the proper use and possible adverse effects of tazorac.  All of the patient's questions and concerns were addressed.

## 2022-12-15 ENCOUNTER — OFFICE VISIT (OUTPATIENT)
Dept: MEDICAL GROUP | Age: 47
End: 2022-12-15
Payer: COMMERCIAL

## 2022-12-15 VITALS
DIASTOLIC BLOOD PRESSURE: 78 MMHG | OXYGEN SATURATION: 100 % | BODY MASS INDEX: 23.19 KG/M2 | HEART RATE: 82 BPM | TEMPERATURE: 97.2 F | RESPIRATION RATE: 16 BRPM | WEIGHT: 126 LBS | SYSTOLIC BLOOD PRESSURE: 132 MMHG | HEIGHT: 62 IN

## 2022-12-15 DIAGNOSIS — Z11.59 NEED FOR HEPATITIS C SCREENING TEST: ICD-10-CM

## 2022-12-15 DIAGNOSIS — F41.1 GENERALIZED ANXIETY DISORDER: ICD-10-CM

## 2022-12-15 DIAGNOSIS — Z12.11 SCREENING FOR COLORECTAL CANCER: ICD-10-CM

## 2022-12-15 DIAGNOSIS — Z23 NEED FOR VACCINATION: ICD-10-CM

## 2022-12-15 DIAGNOSIS — F51.01 PRIMARY INSOMNIA: ICD-10-CM

## 2022-12-15 DIAGNOSIS — Z00.00 BLOOD TESTS FOR ROUTINE GENERAL PHYSICAL EXAMINATION: ICD-10-CM

## 2022-12-15 DIAGNOSIS — E78.2 MIXED HYPERLIPIDEMIA: ICD-10-CM

## 2022-12-15 DIAGNOSIS — Z12.12 SCREENING FOR COLORECTAL CANCER: ICD-10-CM

## 2022-12-15 PROCEDURE — 90471 IMMUNIZATION ADMIN: CPT | Performed by: PHYSICIAN ASSISTANT

## 2022-12-15 PROCEDURE — 90746 HEPB VACCINE 3 DOSE ADULT IM: CPT | Performed by: PHYSICIAN ASSISTANT

## 2022-12-15 PROCEDURE — 99214 OFFICE O/P EST MOD 30 MIN: CPT | Mod: 25 | Performed by: PHYSICIAN ASSISTANT

## 2022-12-15 ASSESSMENT — FIBROSIS 4 INDEX: FIB4 SCORE: 0.6

## 2022-12-15 NOTE — PROGRESS NOTES
"cc: FMLA paperwork    Subjective:     HPI    Venecia Buchanan is a 47 y.o. female presenting for [facility] paperwork to be filled out and completed.  She works for the [employer].  As long as she has her work hours limited to 40 hours/week, and not working more than 8 hours in a day this keeps her anxiety and insomnia at bay.        Review of systems:  See above.       Current Outpatient Medications:     Melatonin 10 MG Cap, Take  by mouth., Disp: , Rfl:     vitamin D (CHOLECALCIFEROL) 1000 UNIT Tab, Take 1,000 Units by mouth every day., Disp: , Rfl:     Allergies, past medical history, past surgical history, family history, social history reviewed and updated    Objective:     Vitals: /78 (BP Location: Left arm, Patient Position: Sitting, BP Cuff Size: Adult)   Pulse 82   Temp 36.2 °C (97.2 °F) (Temporal)   Resp 16   Ht 1.575 m (5' 2\")   Wt 57.2 kg (126 lb)   SpO2 100%   BMI 23.05 kg/m²   General: Alert, pleasant, NAD  HEENT: Normocephalic. Neck supple.  No thyromegaly or masses palpated. No cervical or supraclavicular lymphadenopathy. No carotid bruits   Heart: Regular rate and rhythm.  S1 and S2 normal.  No murmurs appreciated.  Respiratory: Normal respiratory effort.  Clear to auscultation bilaterally.  Skin: Warm, dry, no rashes.  Extremities: No leg edema.  Radial pulses 2+ symmetric  Psych:  Affect/mood is normal, judgement is good, memory is intact, grooming is appropriate.    Assessment/Plan:     Venecia was seen today for paperwork.    Diagnoses and all orders for this visit:    JANICE (generalized anxiety disorder)  -Stable, as long as work hours are limited.  [place] paperwork filled out and completed    Primary insomnia  -Stable.  Continue melatonin as needed    Mixed hyperlipidemia  -Has been mildly elevated.  Working on lifestyle modifications.  Will be due for labs in 6 months.  -     Comp Metabolic Panel; Future  -     Lipid Profile; Future    Need for vaccination  -Immunization given in " clinic today  -     Hepatitis B Vaccine Adult 20+    Blood tests for routine general physical examination  -     CBC WITH DIFFERENTIAL; Future  -     Comp Metabolic Panel; Future  -     Lipid Profile; Future  -     TSH WITH REFLEX TO FT4; Future    Need for hepatitis C screening test  -     HCV Scrn ( [id number] [employer]); Future    Screening for colorectal cancer  -Due for colorectal screening.  Referral placed.  -     Referral to GI for Colonoscopy      Return in about 6 months (around 6/15/2023) for Annual PX, Lab Review.

## 2023-06-22 ENCOUNTER — HOSPITAL ENCOUNTER (OUTPATIENT)
Dept: LAB | Facility: MEDICAL CENTER | Age: 48
End: 2023-06-22
Attending: PHYSICIAN ASSISTANT
Payer: COMMERCIAL

## 2023-06-22 DIAGNOSIS — Z11.59 NEED FOR HEPATITIS C SCREENING TEST: ICD-10-CM

## 2023-06-22 DIAGNOSIS — Z00.00 BLOOD TESTS FOR ROUTINE GENERAL PHYSICAL EXAMINATION: ICD-10-CM

## 2023-06-22 DIAGNOSIS — E78.2 MIXED HYPERLIPIDEMIA: ICD-10-CM

## 2023-06-22 LAB
ALBUMIN SERPL BCP-MCNC: 4.5 G/DL (ref 3.2–4.9)
ALBUMIN/GLOB SERPL: 1.4 G/DL
ALP SERPL-CCNC: 64 U/L (ref 30–99)
ALT SERPL-CCNC: 8 U/L (ref 2–50)
ANION GAP SERPL CALC-SCNC: 12 MMOL/L (ref 7–16)
AST SERPL-CCNC: 9 U/L (ref 12–45)
BASOPHILS # BLD AUTO: 0.5 % (ref 0–1.8)
BASOPHILS # BLD: 0.02 K/UL (ref 0–0.12)
BILIRUB SERPL-MCNC: 0.6 MG/DL (ref 0.1–1.5)
BUN SERPL-MCNC: 10 MG/DL (ref 8–22)
CALCIUM ALBUM COR SERPL-MCNC: 9.2 MG/DL (ref 8.5–10.5)
CALCIUM SERPL-MCNC: 9.6 MG/DL (ref 8.5–10.5)
CHLORIDE SERPL-SCNC: 103 MMOL/L (ref 96–112)
CHOLEST SERPL-MCNC: 173 MG/DL (ref 100–199)
CO2 SERPL-SCNC: 25 MMOL/L (ref 20–33)
CREAT SERPL-MCNC: 0.52 MG/DL (ref 0.5–1.4)
EOSINOPHIL # BLD AUTO: 0.1 K/UL (ref 0–0.51)
EOSINOPHIL NFR BLD: 2.3 % (ref 0–6.9)
ERYTHROCYTE [DISTWIDTH] IN BLOOD BY AUTOMATED COUNT: 42.3 FL (ref 35.9–50)
FASTING STATUS PATIENT QL REPORTED: NORMAL
GFR SERPLBLD CREATININE-BSD FMLA CKD-EPI: 116 ML/MIN/1.73 M 2
GLOBULIN SER CALC-MCNC: 3.3 G/DL (ref 1.9–3.5)
GLUCOSE SERPL-MCNC: 87 MG/DL (ref 65–99)
HCT VFR BLD AUTO: 44 % (ref 37–47)
HCV AB SER QL: NORMAL
HDLC SERPL-MCNC: 50 MG/DL
HGB BLD-MCNC: 14.3 G/DL (ref 12–16)
IMM GRANULOCYTES # BLD AUTO: 0.01 K/UL (ref 0–0.11)
IMM GRANULOCYTES NFR BLD AUTO: 0.2 % (ref 0–0.9)
LDLC SERPL CALC-MCNC: 111 MG/DL
LYMPHOCYTES # BLD AUTO: 1.39 K/UL (ref 1–4.8)
LYMPHOCYTES NFR BLD: 32.6 % (ref 22–41)
MCH RBC QN AUTO: 28.6 PG (ref 27–33)
MCHC RBC AUTO-ENTMCNC: 32.5 G/DL (ref 32.2–35.5)
MCV RBC AUTO: 88 FL (ref 81.4–97.8)
MONOCYTES # BLD AUTO: 0.26 K/UL (ref 0–0.85)
MONOCYTES NFR BLD AUTO: 6.1 % (ref 0–13.4)
NEUTROPHILS # BLD AUTO: 2.49 K/UL (ref 1.82–7.42)
NEUTROPHILS NFR BLD: 58.3 % (ref 44–72)
NRBC # BLD AUTO: 0 K/UL
NRBC BLD-RTO: 0 /100 WBC (ref 0–0.2)
PLATELET # BLD AUTO: 272 K/UL (ref 164–446)
PMV BLD AUTO: 11.7 FL (ref 9–12.9)
POTASSIUM SERPL-SCNC: 4.1 MMOL/L (ref 3.6–5.5)
PROT SERPL-MCNC: 7.8 G/DL (ref 6–8.2)
RBC # BLD AUTO: 5 M/UL (ref 4.2–5.4)
SODIUM SERPL-SCNC: 140 MMOL/L (ref 135–145)
TRIGL SERPL-MCNC: 59 MG/DL (ref 0–149)
TSH SERPL DL<=0.005 MIU/L-ACNC: 1.85 UIU/ML (ref 0.38–5.33)
WBC # BLD AUTO: 4.3 K/UL (ref 4.8–10.8)

## 2023-06-22 PROCEDURE — G0472 HEP C SCREEN HIGH RISK/OTHER: HCPCS

## 2023-06-22 PROCEDURE — 80053 COMPREHEN METABOLIC PANEL: CPT

## 2023-06-22 PROCEDURE — 36415 COLL VENOUS BLD VENIPUNCTURE: CPT

## 2023-06-22 PROCEDURE — 85025 COMPLETE CBC W/AUTO DIFF WBC: CPT

## 2023-06-22 PROCEDURE — 84443 ASSAY THYROID STIM HORMONE: CPT

## 2023-06-22 PROCEDURE — 80061 LIPID PANEL: CPT

## 2023-07-17 ENCOUNTER — APPOINTMENT (OUTPATIENT)
Dept: MEDICAL GROUP | Age: 48
End: 2023-07-17
Payer: COMMERCIAL

## 2023-08-03 ENCOUNTER — OFFICE VISIT (OUTPATIENT)
Dept: MEDICAL GROUP | Age: 48
End: 2023-08-03
Payer: COMMERCIAL

## 2023-08-03 VITALS
HEART RATE: 73 BPM | DIASTOLIC BLOOD PRESSURE: 70 MMHG | RESPIRATION RATE: 16 BRPM | TEMPERATURE: 97 F | HEIGHT: 62 IN | WEIGHT: 122 LBS | SYSTOLIC BLOOD PRESSURE: 116 MMHG | BODY MASS INDEX: 22.45 KG/M2 | OXYGEN SATURATION: 96 %

## 2023-08-03 DIAGNOSIS — Z23 NEED FOR VACCINATION: ICD-10-CM

## 2023-08-03 DIAGNOSIS — E78.2 MIXED HYPERLIPIDEMIA: ICD-10-CM

## 2023-08-03 DIAGNOSIS — Z00.00 WELL ADULT EXAM: ICD-10-CM

## 2023-08-03 DIAGNOSIS — L20.82 FLEXURAL ECZEMA: ICD-10-CM

## 2023-08-03 DIAGNOSIS — Z12.31 VISIT FOR SCREENING MAMMOGRAM: ICD-10-CM

## 2023-08-03 PROCEDURE — 99396 PREV VISIT EST AGE 40-64: CPT | Mod: 25 | Performed by: PHYSICIAN ASSISTANT

## 2023-08-03 PROCEDURE — 90746 HEPB VACCINE 3 DOSE ADULT IM: CPT | Performed by: PHYSICIAN ASSISTANT

## 2023-08-03 PROCEDURE — 3074F SYST BP LT 130 MM HG: CPT | Performed by: PHYSICIAN ASSISTANT

## 2023-08-03 PROCEDURE — 90471 IMMUNIZATION ADMIN: CPT | Performed by: PHYSICIAN ASSISTANT

## 2023-08-03 PROCEDURE — 3078F DIAST BP <80 MM HG: CPT | Performed by: PHYSICIAN ASSISTANT

## 2023-08-03 RX ORDER — TRIAMCINOLONE ACETONIDE 1 MG/G
1 CREAM TOPICAL 2 TIMES DAILY
Qty: 28.4 G | Refills: 1 | Status: SHIPPED | OUTPATIENT
Start: 2023-08-03

## 2023-08-03 ASSESSMENT — PATIENT HEALTH QUESTIONNAIRE - PHQ9: CLINICAL INTERPRETATION OF PHQ2 SCORE: 0

## 2023-08-03 ASSESSMENT — FIBROSIS 4 INDEX: FIB4 SCORE: 0.54

## 2023-08-03 NOTE — PROGRESS NOTES
Subjective:     CC:   Chief Complaint   Patient presents with    Annual Exam       HPI:   Venecia Buchanan is a 47 y.o. female who presents for annual exam    Patient has GYN provider: Yes  Last Pap Smear: 2018   H/O Abnormal Pap: No  Last Mammogram: 2022  Last Colorectal Cancer Screenin2023  Last Tdap: 2021  Received HPV series: Aged out    Exercise: Walks daily for work.  No specific exercise routine  Diet: Well-rounded    No LMP recorded.  Hx STDs: No  Birth control: none  Menses every month with 5 days with moderate bleeding.  Denies cramping.  No significant bloating/fluid retention, pelvic pain, or dyspareunia. No abnormal vaginal discharge.  No breast tenderness, mass, nipple discharge, changes in size or contour, or abnormal cyclic discomfort.     Latest Reference Range & Units 23 08:46   WBC 4.8 - 10.8 K/uL 4.3 (L)   RBC 4.20 - 5.40 M/uL 5.00   Hemoglobin 12.0 - 16.0 g/dL 14.3   Hematocrit 37.0 - 47.0 % 44.0   MCV 81.4 - 97.8 fL 88.0   MCH 27.0 - 33.0 pg 28.6   MCHC 32.2 - 35.5 g/dL 32.5   RDW 35.9 - 50.0 fL 42.3   Platelet Count 164 - 446 K/uL 272   MPV 9.0 - 12.9 fL 11.7   Neutrophils-Polys 44.00 - 72.00 % 58.30   Neutrophils (Absolute) 1.82 - 7.42 K/uL 2.49   Lymphocytes 22.00 - 41.00 % 32.60   Lymphs (Absolute) 1.00 - 4.80 K/uL 1.39   Monocytes 0.00 - 13.40 % 6.10   Monos (Absolute) 0.00 - 0.85 K/uL 0.26   Eosinophils 0.00 - 6.90 % 2.30   Eos (Absolute) 0.00 - 0.51 K/uL 0.10   Basophils 0.00 - 1.80 % 0.50   Baso (Absolute) 0.00 - 0.12 K/uL 0.02   Immature Granulocytes 0.00 - 0.90 % 0.20   Immature Granulocytes (abs) 0.00 - 0.11 K/uL 0.01   Nucleated RBC 0.00 - 0.20 /100 WBC 0.00   NRBC (Absolute) K/uL 0.00   Sodium 135 - 145 mmol/L 140   Potassium 3.6 - 5.5 mmol/L 4.1   Chloride 96 - 112 mmol/L 103   Co2 20 - 33 mmol/L 25   Anion Gap 7.0 - 16.0  12.0   Glucose 65 - 99 mg/dL 87   Bun 8 - 22 mg/dL 10   Creatinine 0.50 - 1.40 mg/dL 0.52   GFR (CKD-EPI) >60 mL/min/1.73 m 2 116   Calcium  8.5 - 10.5 mg/dL 9.6   Correct Calcium 8.5 - 10.5 mg/dL 9.2   AST(SGOT) 12 - 45 U/L 9 (L)   ALT(SGPT) 2 - 50 U/L 8   Alkaline Phosphatase 30 - 99 U/L 64   Total Bilirubin 0.1 - 1.5 mg/dL 0.6   Albumin 3.2 - 4.9 g/dL 4.5   Total Protein 6.0 - 8.2 g/dL 7.8   Globulin 1.9 - 3.5 g/dL 3.3   A-G Ratio g/dL 1.4   Fasting Status  Fasting   Cholesterol,Tot 100 - 199 mg/dL 173   Triglycerides 0 - 149 mg/dL 59   HDL >=40 mg/dL 50   LDL <100 mg/dL 111 (H)   TSH 0.380 - 5.330 uIU/mL 1.850   Hepatitis C Antibody Non-Reactive  Non-Reactive   (L): Data is abnormally low  (H): Data is abnormally high    OB History    Para Term  AB Living   2 1 1 0 1 1   SAB IAB Ectopic Molar Multiple Live Births   1 0 0 0 0 1      She  reports never being sexually active.    She  has a past medical history of Anxiety and Insomnia.  She  has a past surgical history that includes primary c section (3/24/2011).    Family History   Problem Relation Age of Onset    Diabetes Mother     Hypertension Mother     Cancer Maternal Grandfather 60        COLON    Hypertension Father     Cancer Paternal Aunt 50        breast cancer     Social History     Tobacco Use    Smoking status: Never    Smokeless tobacco: Never   Vaping Use    Vaping Use: Never used   Substance Use Topics    Alcohol use: No     Alcohol/week: 0.0 oz    Drug use: No       Patient Active Problem List    Diagnosis Date Noted    Flexural eczema 2023    Gastroesophageal reflux disease 2020    Hyperlipidemia 2020    JANICE (generalized anxiety disorder) 2018    Primary insomnia 2018    Unspecified lump in left breast, subareolar 2018     Current Outpatient Medications   Medication Sig Dispense Refill    triamcinolone acetonide (KENALOG) 0.1 % Cream Apply 1 Applicator topically 2 times a day. 28.4 g 1    Melatonin 10 MG Cap Take  by mouth.      vitamin D (CHOLECALCIFEROL) 1000 UNIT Tab Take 1,000 Units by mouth every day.       No current  "facility-administered medications for this visit.     No Known Allergies    Review of Systems   Constitutional: Negative for fever, chills and malaise/fatigue.   HENT: Negative for congestion.    Eyes: Negative for pain.   Respiratory: Negative for cough and shortness of breath.    Cardiovascular: Negative for chest pain and leg swelling.   Gastrointestinal: Negative for nausea, vomiting, abdominal pain and diarrhea.   Genitourinary: Negative for dysuria and hematuria.   Skin: Negative for rash.   Neurological: Negative for dizziness, focal weakness and headaches.   Endo/Heme/Allergies: Does not bruise/bleed easily.   Psychiatric/Behavioral: Negative for depression.  The patient is not nervous/anxious.      Objective:   /70 (BP Location: Left arm, Patient Position: Sitting, BP Cuff Size: Adult)   Pulse 73   Temp 36.1 °C (97 °F) (Temporal)   Resp 16   Ht 1.575 m (5' 2\")   Wt 55.3 kg (122 lb)   SpO2 96%   BMI 22.31 kg/m²     Wt Readings from Last 4 Encounters:   08/03/23 55.3 kg (122 lb)   12/15/22 57.2 kg (126 lb)   06/28/22 56.2 kg (124 lb)   12/14/21 59.7 kg (131 lb 9.6 oz)         Physical Exam:  Constitutional: Well-developed and well-nourished. Not diaphoretic. No distress.   Skin: Skin is warm and dry. No rash noted.  Head: Atraumatic without lesions.  Eyes: Conjunctivae and extraocular motions are normal. Pupils are equal, round, and reactive to light. No scleral icterus.   Ears:  External ears unremarkable. Tympanic membranes clear and intact.  Nose: Nares patent. Septum midline. Turbinates without erythema nor edema. No discharge.   Mouth/Throat: Tongue normal. Oropharynx is clear and moist. Posterior pharynx without erythema or exudates.  Neck: Supple, trachea midline. Normal range of motion. No thyromegaly present. No lymphadenopathy--cervical or supraclavicular.  Cardiovascular: Regular rate and rhythm, S1 and S2 without murmur, rubs, or gallops.    Respiratory: Effort normal. Clear to " auscultation throughout. No adventitious sounds.   Abdomen: Soft, non tender, and without distention. Active bowel sounds in all four quadrants. No rebound, guarding, masses or HSM.  Extremities: No cyanosis, clubbing, erythema, nor edema. Distal pulses intact and symmetric.   Musculoskeletal: All major joints AROM full in all directions without pain.  Neurological: Alert and oriented x 3. Grossly non-focal. Strength and sensation grossly intact. DTRs 2+/3 and symmetric.   Psychiatric:  Behavior, mood, and affect are appropriate.    Assessment and Plan:     1. Well adult exam  Advised to increase physical activity as tolerated, add in weightbearing exercises.  Continue with good diet control    2. Flexural eczema  -Stable.  Uses triamcinolone intermittently.  Does need refill  - triamcinolone acetonide (KENALOG) 0.1 % Cream; Apply 1 Applicator topically 2 times a day.  Dispense: 28.4 g; Refill: 1    3. Visit for screening mammogram  -Due for mammogram.  - MA-SCREENING MAMMO BILAT W/TOMOSYNTHESIS W/CAD; Future    4. Need for vaccination  -Immunization given in clinic today  - Hepatitis B Vaccine Adult 20+    5. Mixed hyperlipidemia  Minimally elevated.  Work on lifestyle modifications    Health maintenance:     Labs per orders  Immunizations per orders  Patient counseled about skin care, diet, supplements, and exercise.  Discussed  breast self exam, mammography screening, menopause, diet and exercise, colorectal cancer screening     Follow-up: Return in about 1 year (around 8/3/2024) for Annual PX.

## 2023-08-11 ENCOUNTER — HOSPITAL ENCOUNTER (OUTPATIENT)
Dept: RADIOLOGY | Facility: MEDICAL CENTER | Age: 48
End: 2023-08-11
Attending: PHYSICIAN ASSISTANT
Payer: COMMERCIAL

## 2023-08-11 DIAGNOSIS — Z12.31 VISIT FOR SCREENING MAMMOGRAM: ICD-10-CM

## 2023-08-11 PROCEDURE — 77063 BREAST TOMOSYNTHESIS BI: CPT

## 2023-12-12 ENCOUNTER — OFFICE VISIT (OUTPATIENT)
Dept: MEDICAL GROUP | Age: 48
End: 2023-12-12
Payer: COMMERCIAL

## 2023-12-12 VITALS
RESPIRATION RATE: 16 BRPM | TEMPERATURE: 97 F | DIASTOLIC BLOOD PRESSURE: 82 MMHG | OXYGEN SATURATION: 98 % | HEART RATE: 68 BPM | SYSTOLIC BLOOD PRESSURE: 132 MMHG | WEIGHT: 125 LBS | BODY MASS INDEX: 23 KG/M2 | HEIGHT: 62 IN

## 2023-12-12 DIAGNOSIS — Z23 NEED FOR VACCINATION: ICD-10-CM

## 2023-12-12 DIAGNOSIS — F41.1 GENERALIZED ANXIETY DISORDER: ICD-10-CM

## 2023-12-12 DIAGNOSIS — E78.2 MIXED HYPERLIPIDEMIA: ICD-10-CM

## 2023-12-12 DIAGNOSIS — Z00.00 BLOOD TESTS FOR ROUTINE GENERAL PHYSICAL EXAMINATION: ICD-10-CM

## 2023-12-12 PROCEDURE — 3079F DIAST BP 80-89 MM HG: CPT | Performed by: PHYSICIAN ASSISTANT

## 2023-12-12 PROCEDURE — 99214 OFFICE O/P EST MOD 30 MIN: CPT | Performed by: PHYSICIAN ASSISTANT

## 2023-12-12 PROCEDURE — 3075F SYST BP GE 130 - 139MM HG: CPT | Performed by: PHYSICIAN ASSISTANT

## 2023-12-12 ASSESSMENT — FIBROSIS 4 INDEX: FIB4 SCORE: 0.54

## 2024-01-03 ENCOUNTER — NON-PROVIDER VISIT (OUTPATIENT)
Dept: MEDICAL GROUP | Age: 49
End: 2024-01-03
Payer: COMMERCIAL

## 2024-01-03 PROCEDURE — 90746 HEPB VACCINE 3 DOSE ADULT IM: CPT | Performed by: FAMILY MEDICINE

## 2024-01-03 PROCEDURE — 90471 IMMUNIZATION ADMIN: CPT | Performed by: FAMILY MEDICINE

## 2024-01-04 NOTE — PROGRESS NOTES
"Venecia Buchanan is a 48 y.o. female here for a non-provider visit for:   HEPATITIS B 3 of 3    Reason for immunization: continue or complete series started at the office  Immunization records indicate need for vaccine: Yes, confirmed with Epic  Minimum interval has been met for this vaccine: Yes  ABN completed: Not Indicated    VIS Dated  10/15/21 was given to patient: Yes  All IAC Questionnaire questions were answered \"No.\"    Patient tolerated injection and no adverse effects were observed or reported: Yes    Pt scheduled for next dose in series: Not Indicated   "

## 2024-08-06 ENCOUNTER — HOSPITAL ENCOUNTER (OUTPATIENT)
Dept: LAB | Facility: MEDICAL CENTER | Age: 49
End: 2024-08-06
Attending: PHYSICIAN ASSISTANT
Payer: COMMERCIAL

## 2024-08-06 DIAGNOSIS — Z00.00 BLOOD TESTS FOR ROUTINE GENERAL PHYSICAL EXAMINATION: ICD-10-CM

## 2024-08-06 DIAGNOSIS — E78.2 MIXED HYPERLIPIDEMIA: ICD-10-CM

## 2024-08-06 LAB
ALBUMIN SERPL BCP-MCNC: 4.2 G/DL (ref 3.2–4.9)
ALBUMIN/GLOB SERPL: 1.4 G/DL
ALP SERPL-CCNC: 68 U/L (ref 30–99)
ALT SERPL-CCNC: 12 U/L (ref 2–50)
ANION GAP SERPL CALC-SCNC: 11 MMOL/L (ref 7–16)
AST SERPL-CCNC: 16 U/L (ref 12–45)
BASOPHILS # BLD AUTO: 0.5 % (ref 0–1.8)
BASOPHILS # BLD: 0.02 K/UL (ref 0–0.12)
BILIRUB SERPL-MCNC: 0.4 MG/DL (ref 0.1–1.5)
BUN SERPL-MCNC: 10 MG/DL (ref 8–22)
CALCIUM ALBUM COR SERPL-MCNC: 8.9 MG/DL (ref 8.5–10.5)
CALCIUM SERPL-MCNC: 9.1 MG/DL (ref 8.5–10.5)
CHLORIDE SERPL-SCNC: 104 MMOL/L (ref 96–112)
CHOLEST SERPL-MCNC: 179 MG/DL (ref 100–199)
CO2 SERPL-SCNC: 24 MMOL/L (ref 20–33)
CREAT SERPL-MCNC: 0.5 MG/DL (ref 0.5–1.4)
EOSINOPHIL # BLD AUTO: 0.17 K/UL (ref 0–0.51)
EOSINOPHIL NFR BLD: 3.9 % (ref 0–6.9)
ERYTHROCYTE [DISTWIDTH] IN BLOOD BY AUTOMATED COUNT: 42.7 FL (ref 35.9–50)
FASTING STATUS PATIENT QL REPORTED: NORMAL
GFR SERPLBLD CREATININE-BSD FMLA CKD-EPI: 116 ML/MIN/1.73 M 2
GLOBULIN SER CALC-MCNC: 3.1 G/DL (ref 1.9–3.5)
GLUCOSE SERPL-MCNC: 92 MG/DL (ref 65–99)
HCT VFR BLD AUTO: 41.9 % (ref 37–47)
HDLC SERPL-MCNC: 59 MG/DL
HGB BLD-MCNC: 13.4 G/DL (ref 12–16)
IMM GRANULOCYTES # BLD AUTO: 0.01 K/UL (ref 0–0.11)
IMM GRANULOCYTES NFR BLD AUTO: 0.2 % (ref 0–0.9)
LDLC SERPL CALC-MCNC: 104 MG/DL
LYMPHOCYTES # BLD AUTO: 1.58 K/UL (ref 1–4.8)
LYMPHOCYTES NFR BLD: 36 % (ref 22–41)
MCH RBC QN AUTO: 29 PG (ref 27–33)
MCHC RBC AUTO-ENTMCNC: 32 G/DL (ref 32.2–35.5)
MCV RBC AUTO: 90.7 FL (ref 81.4–97.8)
MONOCYTES # BLD AUTO: 0.29 K/UL (ref 0–0.85)
MONOCYTES NFR BLD AUTO: 6.6 % (ref 0–13.4)
NEUTROPHILS # BLD AUTO: 2.32 K/UL (ref 1.82–7.42)
NEUTROPHILS NFR BLD: 52.8 % (ref 44–72)
NRBC # BLD AUTO: 0 K/UL
NRBC BLD-RTO: 0 /100 WBC (ref 0–0.2)
PLATELET # BLD AUTO: 248 K/UL (ref 164–446)
PMV BLD AUTO: 11.4 FL (ref 9–12.9)
POTASSIUM SERPL-SCNC: 4.2 MMOL/L (ref 3.6–5.5)
PROT SERPL-MCNC: 7.3 G/DL (ref 6–8.2)
RBC # BLD AUTO: 4.62 M/UL (ref 4.2–5.4)
SODIUM SERPL-SCNC: 139 MMOL/L (ref 135–145)
TRIGL SERPL-MCNC: 81 MG/DL (ref 0–149)
TSH SERPL DL<=0.005 MIU/L-ACNC: 2.09 UIU/ML (ref 0.38–5.33)
WBC # BLD AUTO: 4.4 K/UL (ref 4.8–10.8)

## 2024-08-06 PROCEDURE — 85025 COMPLETE CBC W/AUTO DIFF WBC: CPT

## 2024-08-06 PROCEDURE — 80061 LIPID PANEL: CPT

## 2024-08-06 PROCEDURE — 36415 COLL VENOUS BLD VENIPUNCTURE: CPT

## 2024-08-06 PROCEDURE — 80053 COMPREHEN METABOLIC PANEL: CPT

## 2024-08-06 PROCEDURE — 84443 ASSAY THYROID STIM HORMONE: CPT

## 2024-08-13 ENCOUNTER — OFFICE VISIT (OUTPATIENT)
Dept: MEDICAL GROUP | Age: 49
End: 2024-08-13
Payer: COMMERCIAL

## 2024-08-13 VITALS
WEIGHT: 129 LBS | RESPIRATION RATE: 16 BRPM | HEART RATE: 69 BPM | SYSTOLIC BLOOD PRESSURE: 108 MMHG | DIASTOLIC BLOOD PRESSURE: 60 MMHG | OXYGEN SATURATION: 96 % | TEMPERATURE: 97 F | HEIGHT: 62 IN | BODY MASS INDEX: 23.74 KG/M2

## 2024-08-13 DIAGNOSIS — Z00.00 WELL ADULT EXAM: ICD-10-CM

## 2024-08-13 DIAGNOSIS — L20.82 FLEXURAL ECZEMA: ICD-10-CM

## 2024-08-13 DIAGNOSIS — E78.2 MIXED HYPERLIPIDEMIA: ICD-10-CM

## 2024-08-13 PROCEDURE — 3078F DIAST BP <80 MM HG: CPT | Performed by: PHYSICIAN ASSISTANT

## 2024-08-13 PROCEDURE — 3074F SYST BP LT 130 MM HG: CPT | Performed by: PHYSICIAN ASSISTANT

## 2024-08-13 PROCEDURE — 99396 PREV VISIT EST AGE 40-64: CPT | Performed by: PHYSICIAN ASSISTANT

## 2024-08-13 SDOH — ECONOMIC STABILITY: HOUSING INSECURITY

## 2024-08-13 SDOH — HEALTH STABILITY: PHYSICAL HEALTH: ON AVERAGE, HOW MANY DAYS PER WEEK DO YOU ENGAGE IN MODERATE TO STRENUOUS EXERCISE (LIKE A BRISK WALK)?: 1 DAY

## 2024-08-13 SDOH — HEALTH STABILITY: PHYSICAL HEALTH: ON AVERAGE, HOW MANY MINUTES DO YOU ENGAGE IN EXERCISE AT THIS LEVEL?: 10 MIN

## 2024-08-13 SDOH — ECONOMIC STABILITY: FOOD INSECURITY: WITHIN THE PAST 12 MONTHS, YOU WORRIED THAT YOUR FOOD WOULD RUN OUT BEFORE YOU GOT MONEY TO BUY MORE.: NEVER TRUE

## 2024-08-13 SDOH — ECONOMIC STABILITY: INCOME INSECURITY: HOW HARD IS IT FOR YOU TO PAY FOR THE VERY BASICS LIKE FOOD, HOUSING, MEDICAL CARE, AND HEATING?: NOT VERY HARD

## 2024-08-13 SDOH — ECONOMIC STABILITY: INCOME INSECURITY: IN THE LAST 12 MONTHS, WAS THERE A TIME WHEN YOU WERE NOT ABLE TO PAY THE MORTGAGE OR RENT ON TIME?: NO

## 2024-08-13 SDOH — ECONOMIC STABILITY: FOOD INSECURITY: WITHIN THE PAST 12 MONTHS, THE FOOD YOU BOUGHT JUST DIDN'T LAST AND YOU DIDN'T HAVE MONEY TO GET MORE.: NEVER TRUE

## 2024-08-13 ASSESSMENT — SOCIAL DETERMINANTS OF HEALTH (SDOH)
WITHIN THE PAST 12 MONTHS, YOU WORRIED THAT YOUR FOOD WOULD RUN OUT BEFORE YOU GOT THE MONEY TO BUY MORE: NEVER TRUE
HOW OFTEN DO YOU GET TOGETHER WITH FRIENDS OR RELATIVES?: ONCE A WEEK
IN A TYPICAL WEEK, HOW MANY TIMES DO YOU TALK ON THE PHONE WITH FAMILY, FRIENDS, OR NEIGHBORS?: TWICE A WEEK
IN A TYPICAL WEEK, HOW MANY TIMES DO YOU TALK ON THE PHONE WITH FAMILY, FRIENDS, OR NEIGHBORS?: TWICE A WEEK
IN THE PAST 12 MONTHS, HAS THE ELECTRIC, GAS, OIL, OR WATER COMPANY THREATENED TO SHUT OFF SERVICE IN YOUR HOME?: NO
DO YOU BELONG TO ANY CLUBS OR ORGANIZATIONS SUCH AS CHURCH GROUPS UNIONS, FRATERNAL OR ATHLETIC GROUPS, OR SCHOOL GROUPS?: NO
HOW OFTEN DO YOU HAVE SIX OR MORE DRINKS ON ONE OCCASION: NEVER
DO YOU BELONG TO ANY CLUBS OR ORGANIZATIONS SUCH AS CHURCH GROUPS UNIONS, FRATERNAL OR ATHLETIC GROUPS, OR SCHOOL GROUPS?: NO
HOW HARD IS IT FOR YOU TO PAY FOR THE VERY BASICS LIKE FOOD, HOUSING, MEDICAL CARE, AND HEATING?: NOT VERY HARD
HOW OFTEN DO YOU ATTENT MEETINGS OF THE CLUB OR ORGANIZATION YOU BELONG TO?: NEVER
HOW OFTEN DO YOU ATTEND CHURCH OR RELIGIOUS SERVICES?: NEVER
HOW OFTEN DO YOU GET TOGETHER WITH FRIENDS OR RELATIVES?: ONCE A WEEK
HOW OFTEN DO YOU ATTENT MEETINGS OF THE CLUB OR ORGANIZATION YOU BELONG TO?: NEVER
HOW OFTEN DO YOU ATTEND CHURCH OR RELIGIOUS SERVICES?: NEVER
HOW MANY DRINKS CONTAINING ALCOHOL DO YOU HAVE ON A TYPICAL DAY WHEN YOU ARE DRINKING: PATIENT DOES NOT DRINK

## 2024-08-13 ASSESSMENT — LIFESTYLE VARIABLES
HOW MANY STANDARD DRINKS CONTAINING ALCOHOL DO YOU HAVE ON A TYPICAL DAY: PATIENT DOES NOT DRINK
HOW OFTEN DO YOU HAVE SIX OR MORE DRINKS ON ONE OCCASION: NEVER

## 2024-08-13 ASSESSMENT — FIBROSIS 4 INDEX: FIB4 SCORE: 0.88

## 2024-08-13 ASSESSMENT — PATIENT HEALTH QUESTIONNAIRE - PHQ9: CLINICAL INTERPRETATION OF PHQ2 SCORE: 0

## 2024-08-13 NOTE — PROGRESS NOTES
Subjective:     CC:   Chief Complaint   Patient presents with    Annual Exam       HPI:   Venecia Buchanan is a 48 y.o. female who presents for annual exam    Patient has GYN provider: Yes  Last Pap Smear: 2024   H/O Abnormal Pap: No  Last Mammogram: 2023  Last Colorectal Cancer Screenin2023  Last Tdap: 2021  Received HPV series: Aged out    Exercise: moderate regular exercise, aerobic < 3 days a week  Diet: Fair      Patient's last menstrual period was 2024 (approximate).  Hx STDs: No  Birth control: None  Menses every month with 3 days with spotting, moderate bleeding.  Denies cramping.  No significant bloating/fluid retention, pelvic pain, or dyspareunia. No abnormal vaginal discharge.  No breast tenderness, mass, nipple discharge, changes in size or contour, or abnormal cyclic discomfort.   Latest Reference Range & Units 24 06:22   WBC 4.8 - 10.8 K/uL 4.4 (L)   RBC 4.20 - 5.40 M/uL 4.62   Hemoglobin 12.0 - 16.0 g/dL 13.4   Hematocrit 37.0 - 47.0 % 41.9   MCV 81.4 - 97.8 fL 90.7   MCH 27.0 - 33.0 pg 29.0   MCHC 32.2 - 35.5 g/dL 32.0 (L)   RDW 35.9 - 50.0 fL 42.7   Platelet Count 164 - 446 K/uL 248   MPV 9.0 - 12.9 fL 11.4   Neutrophils-Polys 44.00 - 72.00 % 52.80   Neutrophils (Absolute) 1.82 - 7.42 K/uL 2.32   Lymphocytes 22.00 - 41.00 % 36.00   Lymphs (Absolute) 1.00 - 4.80 K/uL 1.58   Monocytes 0.00 - 13.40 % 6.60   Monos (Absolute) 0.00 - 0.85 K/uL 0.29   Eosinophils 0.00 - 6.90 % 3.90   Eos (Absolute) 0.00 - 0.51 K/uL 0.17   Basophils 0.00 - 1.80 % 0.50   Baso (Absolute) 0.00 - 0.12 K/uL 0.02   Immature Granulocytes 0.00 - 0.90 % 0.20   Immature Granulocytes (abs) 0.00 - 0.11 K/uL 0.01   Nucleated RBC 0.00 - 0.20 /100 WBC 0.00   NRBC (Absolute) K/uL 0.00   Sodium 135 - 145 mmol/L 139   Potassium 3.6 - 5.5 mmol/L 4.2   Chloride 96 - 112 mmol/L 104   Co2 20 - 33 mmol/L 24   Anion Gap 7.0 - 16.0  11.0   Glucose 65 - 99 mg/dL 92   Bun 8 - 22 mg/dL 10   Creatinine 0.50 - 1.40 mg/dL 0.50    GFR (CKD-EPI) >60 mL/min/1.73 m 2 116   Calcium 8.5 - 10.5 mg/dL 9.1   Correct Calcium 8.5 - 10.5 mg/dL 8.9   AST(SGOT) 12 - 45 U/L 16   ALT(SGPT) 2 - 50 U/L 12   Alkaline Phosphatase 30 - 99 U/L 68   Total Bilirubin 0.1 - 1.5 mg/dL 0.4   Albumin 3.2 - 4.9 g/dL 4.2   Total Protein 6.0 - 8.2 g/dL 7.3   Globulin 1.9 - 3.5 g/dL 3.1   A-G Ratio g/dL 1.4   Fasting Status  Fasting   Cholesterol,Tot 100 - 199 mg/dL 179   Triglycerides 0 - 149 mg/dL 81   HDL >=40 mg/dL 59   LDL <100 mg/dL 104 (H)   TSH 0.380 - 5.330 uIU/mL 2.090   (L): Data is abnormally low  (H): Data is abnormally high      OB History    Para Term  AB Living   2 1 1 0 1 1   SAB IAB Ectopic Molar Multiple Live Births   1 0 0 0 0 1      She  reports being sexually active and has had partner(s) who are male. She reports using the following method of birth control/protection: Coitus Interruptus.    She  has a past medical history of Anxiety, Insomnia, and Migraine.  She  has a past surgical history that includes primary c section (3/24/2011).    Family History   Problem Relation Age of Onset    Diabetes Mother     Hypertension Mother     Cancer Maternal Grandfather 60        COLON    Hypertension Father     Cancer Paternal Aunt 50        breast cancer     Social History     Tobacco Use    Smoking status: Never    Smokeless tobacco: Never   Vaping Use    Vaping status: Never Used   Substance Use Topics    Alcohol use: No    Drug use: No       Patient Active Problem List    Diagnosis Date Noted    Flexural eczema 2023    Gastroesophageal reflux disease 2020    Hyperlipidemia 2020    JANICE (generalized anxiety disorder) 2018    Primary insomnia 2018    Unspecified lump in left breast, subareolar 2018     Current Outpatient Medications   Medication Sig Dispense Refill    triamcinolone acetonide (KENALOG) 0.1 % Cream Apply 1 Applicator topically 2 times a day. 28.4 g 1    Melatonin 10 MG Cap Take  by mouth.       "vitamin D (CHOLECALCIFEROL) 1000 UNIT Tab Take 1,000 Units by mouth every day.       No current facility-administered medications for this visit.     No Known Allergies    Review of Systems   Constitutional: Negative for fever, chills and malaise/fatigue.   HENT: Negative for congestion.    Eyes: Negative for pain.   Respiratory: Negative for cough and shortness of breath.    Cardiovascular: Negative for chest pain and leg swelling.   Gastrointestinal: Negative for nausea, vomiting, abdominal pain and diarrhea.   Genitourinary: Negative for dysuria and hematuria.   Skin: Negative for rash.   Neurological: Negative for dizziness, focal weakness and headaches.   Endo/Heme/Allergies: Does not bruise/bleed easily.   Psychiatric/Behavioral: Negative for depression.  The patient is not nervous/anxious.      Objective:   /60 (BP Location: Left arm, Patient Position: Sitting, BP Cuff Size: Adult)   Pulse 69   Temp 36.1 °C (97 °F) (Temporal)   Resp 16   Ht 1.575 m (5' 2\")   Wt 58.5 kg (129 lb)   LMP 07/11/2024 (Approximate)   SpO2 96%   BMI 23.59 kg/m²     Wt Readings from Last 4 Encounters:   08/13/24 58.5 kg (129 lb)   12/12/23 56.7 kg (125 lb)   08/03/23 55.3 kg (122 lb)   12/15/22 57.2 kg (126 lb)         Physical Exam:  Constitutional: Well-developed and well-nourished. Not diaphoretic. No distress.   Skin: Skin is warm and dry. No rash noted.  Head: Atraumatic without lesions.  Eyes: Conjunctivae and extraocular motions are normal. Pupils are equal, round, and reactive to light. No scleral icterus.   Ears:  External ears unremarkable. Tympanic membranes clear and intact.  Mouth/Throat: Tongue normal. Oropharynx is clear and moist. Posterior pharynx without erythema or exudates.  Neck: Supple, trachea midline. Normal range of motion. No thyromegaly present. No lymphadenopathy--cervical or supraclavicular.  Cardiovascular: Regular rate and rhythm, S1 and S2 without murmur, rubs, or gallops.    Respiratory: " Effort normal. Clear to auscultation throughout. No adventitious sounds.   Abdomen: Soft, non tender, and without distention. Active bowel sounds in all four quadrants. No rebound, guarding, masses or HSM.  Extremities: No cyanosis, clubbing, erythema, nor edema. Distal pulses intact and symmetric.   Musculoskeletal: All major joints AROM full in all directions without pain.  Neurological: Alert and oriented x 3. Grossly non-focal. Strength and sensation grossly intact. DTRs 2+/3 and symmetric.   Psychiatric:  Behavior, mood, and affect are appropriate.    Assessment and Plan:     1. Well adult exam  Advised to increase physical activity as tolerated, add in weightbearing activities.  Reviewed diet control    2. Mixed hyperlipidemia  Mildly elevated, but improving.  Continue with lifestyle modifications.  Repeat labs again in a year    3. Flexural eczema  Stable.  Continue triamcinolone cream as needed    Health maintenance:     Labs per orders  Immunizations per orders  Patient counseled about skin care, diet, supplements, and exercise.  Discussed  breast self exam, mammography screening, menopause, diet and exercise, colorectal cancer screening     Follow-up: Return in about 4 months (around 12/13/2024) for [facility] paperwork.

## 2024-08-20 ENCOUNTER — HOSPITAL ENCOUNTER (OUTPATIENT)
Dept: RADIOLOGY | Facility: MEDICAL CENTER | Age: 49
End: 2024-08-20
Attending: PHYSICIAN ASSISTANT
Payer: COMMERCIAL

## 2024-08-20 DIAGNOSIS — Z12.31 VISIT FOR SCREENING MAMMOGRAM: ICD-10-CM

## 2024-08-20 PROCEDURE — 77067 SCR MAMMO BI INCL CAD: CPT

## 2024-08-28 NOTE — PROGRESS NOTES
"cc:  FMLA     Subjective:     HPI    Venecia Buchanan is a 47 y.o. female presenting for [facility] paperwork to be filled out and completed/updated for this year.  She works for the [employer].  As long as she has her work hours are limited to 40 hours/week, and not working more than 8 hours in a day this keeps her anxiety and insomnia at bay.    Review of systems:  See above.       Current Outpatient Medications:     triamcinolone acetonide (KENALOG) 0.1 % Cream, Apply 1 Applicator topically 2 times a day., Disp: 28.4 g, Rfl: 1    Melatonin 10 MG Cap, Take  by mouth., Disp: , Rfl:     vitamin D (CHOLECALCIFEROL) 1000 UNIT Tab, Take 1,000 Units by mouth every day., Disp: , Rfl:     Allergies, past medical history, past surgical history, family history, social history reviewed and updated    Objective:     Vitals: /82 (BP Location: Left arm, Patient Position: Sitting, BP Cuff Size: Adult)   Pulse 68   Temp 36.1 °C (97 °F) (Temporal)   Resp 16   Ht 1.575 m (5' 2\")   Wt 56.7 kg (125 lb)   SpO2 98%   BMI 22.86 kg/m²   General: Alert, pleasant, NAD  HEENT: Normocephalic. Neck supple.  No carotid bruits   Heart: Regular rate and rhythm.  S1 and S2 normal.  No murmurs appreciated.  Respiratory: Normal respiratory effort.  Clear to auscultation bilaterally.  Skin: Warm, dry, no rashes.  Psych:  Affect/mood is normal, judgement is good, memory is intact, grooming is appropriate.    Assessment/Plan:     Venecia was seen today for paperwork.    Diagnoses and all orders for this visit:    JANICE (generalized anxiety disorder)  Stable.  Doing well without medications.  A lot of this is centered around work.  As long as she does not work over 8 hours a day, no more than 40 hours a week her anxiety is controlled.  [facility] paperwork filled out and completed to visit today    Mixed hyperlipidemia  -Monitoring.  Working on lifestyle modifications.  Due for labs in 6 months.  -     Lipid Profile; Future    Blood tests for " routine general physical examination  -     TSH WITH REFLEX TO FT4; Future  -     Lipid Profile; Future  -     Comp Metabolic Panel; Future  -     CBC WITH DIFFERENTIAL; Future    Need for vaccination  Immunization given in clinic today  - Hepatitis B Vaccine Adult 20+              Return in about 8 months (around 8/12/2024) for Annual PX, Lab Review.   no No

## 2024-12-17 ENCOUNTER — APPOINTMENT (OUTPATIENT)
Dept: MEDICAL GROUP | Age: 49
End: 2024-12-17
Payer: COMMERCIAL

## 2024-12-23 ENCOUNTER — OFFICE VISIT (OUTPATIENT)
Dept: MEDICAL GROUP | Age: 49
End: 2024-12-23
Payer: COMMERCIAL

## 2024-12-23 VITALS
HEART RATE: 70 BPM | WEIGHT: 128 LBS | SYSTOLIC BLOOD PRESSURE: 118 MMHG | TEMPERATURE: 97.6 F | BODY MASS INDEX: 23.55 KG/M2 | HEIGHT: 62 IN | DIASTOLIC BLOOD PRESSURE: 72 MMHG | OXYGEN SATURATION: 98 %

## 2024-12-23 DIAGNOSIS — F41.1 GENERALIZED ANXIETY DISORDER: ICD-10-CM

## 2024-12-23 DIAGNOSIS — F51.01 PRIMARY INSOMNIA: ICD-10-CM

## 2024-12-23 DIAGNOSIS — E78.2 MIXED HYPERLIPIDEMIA: ICD-10-CM

## 2024-12-23 DIAGNOSIS — Z00.00 BLOOD TESTS FOR ROUTINE GENERAL PHYSICAL EXAMINATION: ICD-10-CM

## 2024-12-23 PROCEDURE — 3074F SYST BP LT 130 MM HG: CPT | Performed by: PHYSICIAN ASSISTANT

## 2024-12-23 PROCEDURE — 99214 OFFICE O/P EST MOD 30 MIN: CPT | Performed by: PHYSICIAN ASSISTANT

## 2024-12-23 PROCEDURE — 3078F DIAST BP <80 MM HG: CPT | Performed by: PHYSICIAN ASSISTANT

## 2024-12-23 ASSESSMENT — FIBROSIS 4 INDEX: FIB4 SCORE: 0.89

## 2024-12-23 NOTE — PROGRESS NOTES
"cc:  [place]    Subjective:     HPI    Venecia Buchanan is a 47 y.o. female presenting for [facility] paperwork to be filled out and completed/updated for this year.  She works for the [employer].  As long as she has her work hours are limited to 40 hours/week, and not working more than 8 hours in a day this keeps her anxiety and insomnia at bay.     Review of systems:  See above.       Current Outpatient Medications:     triamcinolone acetonide (KENALOG) 0.1 % Cream, Apply 1 Applicator topically 2 times a day., Disp: 28.4 g, Rfl: 1    Melatonin 10 MG Cap, Take  by mouth., Disp: , Rfl:     vitamin D (CHOLECALCIFEROL) 1000 UNIT Tab, Take 1,000 Units by mouth every day., Disp: , Rfl:     Allergies, past medical history, past surgical history, family history, social history reviewed and updated    Objective:     Vitals: /72 (BP Location: Left arm, Patient Position: Sitting, BP Cuff Size: Adult)   Pulse 70   Temp 36.4 °C (97.6 °F) (Temporal)   Ht 1.575 m (5' 2\")   Wt 58.1 kg (128 lb)   SpO2 98%   BMI 23.41 kg/m²   General: Alert, pleasant, NAD  HEENT: Normocephalic. Neck supple.   No carotid bruits   Heart: Regular rate and rhythm.  S1 and S2 normal.  No murmurs appreciated.  Respiratory: Normal respiratory effort.  Clear to auscultation bilaterally.  Skin: Warm, dry, no rashes.  Psych:  Affect/mood is normal, judgement is good, memory is intact, grooming is appropriate.    Assessment/Plan:     Venecia was seen today for paperwork.    Diagnoses and all orders for this visit:    Primary insomnia  Stable.  Doing well without medications.    JANICE (generalized anxiety disorder)  Stable.  Doing well without medications.  As long as she does not have to work more than 40 hours a week at work does not cause her anxiety or stress.  [facility] paperwork filled out and completed for the year    Mixed hyperlipidemia  -Monitoring.  Working on lifestyle modifications.  Due for labs in 8 months.  -     Lipid Profile; " Future    Blood tests for routine general physical examination  -     TSH WITH REFLEX TO FT4; Future  -     Lipid Profile; Future  -     Comp Metabolic Panel; Future  -     CBC WITH DIFFERENTIAL; Future        Return in about 8 months (around 8/23/2025) for Annual PX, Lab Review.

## 2025-08-19 ENCOUNTER — HOSPITAL ENCOUNTER (OUTPATIENT)
Dept: LAB | Facility: MEDICAL CENTER | Age: 50
End: 2025-08-19
Attending: PHYSICIAN ASSISTANT
Payer: COMMERCIAL

## 2025-08-19 DIAGNOSIS — E78.2 MIXED HYPERLIPIDEMIA: ICD-10-CM

## 2025-08-19 DIAGNOSIS — Z00.00 BLOOD TESTS FOR ROUTINE GENERAL PHYSICAL EXAMINATION: ICD-10-CM

## 2025-08-19 LAB
ALBUMIN SERPL BCP-MCNC: 4.2 G/DL (ref 3.2–4.9)
ALBUMIN/GLOB SERPL: 1.4 G/DL
ALP SERPL-CCNC: 62 U/L (ref 30–99)
ALT SERPL-CCNC: 9 U/L (ref 2–50)
ANION GAP SERPL CALC-SCNC: 10 MMOL/L (ref 7–16)
AST SERPL-CCNC: 18 U/L (ref 12–45)
BASOPHILS # BLD AUTO: 0.6 % (ref 0–1.8)
BASOPHILS # BLD: 0.03 K/UL (ref 0–0.12)
BILIRUB SERPL-MCNC: 0.4 MG/DL (ref 0.1–1.5)
BUN SERPL-MCNC: 8 MG/DL (ref 8–22)
CALCIUM ALBUM COR SERPL-MCNC: 9.2 MG/DL (ref 8.5–10.5)
CALCIUM SERPL-MCNC: 9.4 MG/DL (ref 8.5–10.5)
CHLORIDE SERPL-SCNC: 103 MMOL/L (ref 96–112)
CHOLEST SERPL-MCNC: 182 MG/DL (ref 100–199)
CO2 SERPL-SCNC: 23 MMOL/L (ref 20–33)
CREAT SERPL-MCNC: 0.59 MG/DL (ref 0.5–1.4)
EOSINOPHIL # BLD AUTO: 0.13 K/UL (ref 0–0.51)
EOSINOPHIL NFR BLD: 2.7 % (ref 0–6.9)
ERYTHROCYTE [DISTWIDTH] IN BLOOD BY AUTOMATED COUNT: 42.6 FL (ref 35.9–50)
FASTING STATUS PATIENT QL REPORTED: NORMAL
GFR SERPLBLD CREATININE-BSD FMLA CKD-EPI: 111 ML/MIN/1.73 M 2
GLOBULIN SER CALC-MCNC: 3.1 G/DL (ref 1.9–3.5)
GLUCOSE SERPL-MCNC: 87 MG/DL (ref 65–99)
HCT VFR BLD AUTO: 41.8 % (ref 37–47)
HDLC SERPL-MCNC: 59 MG/DL
HGB BLD-MCNC: 12.9 G/DL (ref 12–16)
IMM GRANULOCYTES # BLD AUTO: 0.01 K/UL (ref 0–0.11)
IMM GRANULOCYTES NFR BLD AUTO: 0.2 % (ref 0–0.9)
LDLC SERPL CALC-MCNC: 112 MG/DL
LYMPHOCYTES # BLD AUTO: 1.52 K/UL (ref 1–4.8)
LYMPHOCYTES NFR BLD: 31.9 % (ref 22–41)
MCH RBC QN AUTO: 28.3 PG (ref 27–33)
MCHC RBC AUTO-ENTMCNC: 30.9 G/DL (ref 32.2–35.5)
MCV RBC AUTO: 91.7 FL (ref 81.4–97.8)
MONOCYTES # BLD AUTO: 0.38 K/UL (ref 0–0.85)
MONOCYTES NFR BLD AUTO: 8 % (ref 0–13.4)
NEUTROPHILS # BLD AUTO: 2.69 K/UL (ref 1.82–7.42)
NEUTROPHILS NFR BLD: 56.6 % (ref 44–72)
NRBC # BLD AUTO: 0 K/UL
NRBC BLD-RTO: 0 /100 WBC (ref 0–0.2)
PLATELET # BLD AUTO: 296 K/UL (ref 164–446)
PMV BLD AUTO: 11.5 FL (ref 9–12.9)
POTASSIUM SERPL-SCNC: 4.1 MMOL/L (ref 3.6–5.5)
PROT SERPL-MCNC: 7.3 G/DL (ref 6–8.2)
RBC # BLD AUTO: 4.56 M/UL (ref 4.2–5.4)
SODIUM SERPL-SCNC: 136 MMOL/L (ref 135–145)
TRIGL SERPL-MCNC: 55 MG/DL (ref 0–149)
TSH SERPL DL<=0.005 MIU/L-ACNC: 1.5 UIU/ML (ref 0.38–5.33)
WBC # BLD AUTO: 4.8 K/UL (ref 4.8–10.8)

## 2025-08-19 PROCEDURE — 36415 COLL VENOUS BLD VENIPUNCTURE: CPT

## 2025-08-19 PROCEDURE — 84443 ASSAY THYROID STIM HORMONE: CPT

## 2025-08-19 PROCEDURE — 85025 COMPLETE CBC W/AUTO DIFF WBC: CPT

## 2025-08-19 PROCEDURE — 80053 COMPREHEN METABOLIC PANEL: CPT

## 2025-08-19 PROCEDURE — 80061 LIPID PANEL: CPT

## 2025-08-22 ENCOUNTER — APPOINTMENT (OUTPATIENT)
Dept: RADIOLOGY | Facility: MEDICAL CENTER | Age: 50
End: 2025-08-22
Attending: PHYSICIAN ASSISTANT
Payer: COMMERCIAL

## 2025-08-22 VITALS — WEIGHT: 128 LBS | HEIGHT: 62 IN | BODY MASS INDEX: 23.55 KG/M2

## 2025-08-22 DIAGNOSIS — Z12.31 VISIT FOR SCREENING MAMMOGRAM: ICD-10-CM

## 2025-08-22 PROCEDURE — 77067 SCR MAMMO BI INCL CAD: CPT

## 2025-08-22 ASSESSMENT — FIBROSIS 4 INDEX: FIB4 SCORE: .972972972972972973

## 2025-08-25 ENCOUNTER — OFFICE VISIT (OUTPATIENT)
Dept: MEDICAL GROUP | Age: 50
End: 2025-08-25
Payer: COMMERCIAL

## 2025-08-25 VITALS
SYSTOLIC BLOOD PRESSURE: 112 MMHG | OXYGEN SATURATION: 98 % | TEMPERATURE: 97.3 F | BODY MASS INDEX: 24.48 KG/M2 | WEIGHT: 133 LBS | HEART RATE: 74 BPM | HEIGHT: 62 IN | DIASTOLIC BLOOD PRESSURE: 64 MMHG

## 2025-08-25 DIAGNOSIS — Z00.00 WELL ADULT EXAM: Primary | ICD-10-CM

## 2025-08-25 DIAGNOSIS — E78.2 MIXED HYPERLIPIDEMIA: ICD-10-CM

## 2025-08-25 PROCEDURE — 3078F DIAST BP <80 MM HG: CPT | Performed by: PHYSICIAN ASSISTANT

## 2025-08-25 PROCEDURE — 99396 PREV VISIT EST AGE 40-64: CPT | Performed by: PHYSICIAN ASSISTANT

## 2025-08-25 PROCEDURE — 3074F SYST BP LT 130 MM HG: CPT | Performed by: PHYSICIAN ASSISTANT

## 2025-08-25 ASSESSMENT — PATIENT HEALTH QUESTIONNAIRE - PHQ9: CLINICAL INTERPRETATION OF PHQ2 SCORE: 0

## 2025-08-25 ASSESSMENT — FIBROSIS 4 INDEX: FIB4 SCORE: .972972972972972973
